# Patient Record
Sex: FEMALE
[De-identification: names, ages, dates, MRNs, and addresses within clinical notes are randomized per-mention and may not be internally consistent; named-entity substitution may affect disease eponyms.]

---

## 2021-07-01 ENCOUNTER — APPOINTMENT (OUTPATIENT)
Dept: OPHTHALMOLOGY | Facility: CLINIC | Age: 81
End: 2021-07-01

## 2021-08-10 ENCOUNTER — APPOINTMENT (OUTPATIENT)
Dept: OPHTHALMOLOGY | Facility: CLINIC | Age: 81
End: 2021-08-10
Payer: MEDICARE

## 2021-08-10 ENCOUNTER — NON-APPOINTMENT (OUTPATIENT)
Age: 81
End: 2021-08-10

## 2021-08-10 PROCEDURE — 92004 COMPRE OPH EXAM NEW PT 1/>: CPT

## 2021-12-27 PROBLEM — Z00.00 ENCOUNTER FOR PREVENTIVE HEALTH EXAMINATION: Status: ACTIVE | Noted: 2021-12-27

## 2022-02-17 ENCOUNTER — APPOINTMENT (OUTPATIENT)
Dept: OPHTHALMOLOGY | Facility: CLINIC | Age: 82
End: 2022-02-17
Payer: MEDICARE

## 2022-02-17 ENCOUNTER — NON-APPOINTMENT (OUTPATIENT)
Age: 82
End: 2022-02-17

## 2022-02-17 PROCEDURE — 76514 ECHO EXAM OF EYE THICKNESS: CPT

## 2022-02-17 PROCEDURE — 92012 INTRM OPH EXAM EST PATIENT: CPT

## 2022-02-17 PROCEDURE — 92133 CPTRZD OPH DX IMG PST SGM ON: CPT

## 2022-08-04 ENCOUNTER — NON-APPOINTMENT (OUTPATIENT)
Age: 82
End: 2022-08-04

## 2022-08-04 ENCOUNTER — APPOINTMENT (OUTPATIENT)
Dept: OPHTHALMOLOGY | Facility: CLINIC | Age: 82
End: 2022-08-04

## 2022-08-04 PROCEDURE — 92014 COMPRE OPH EXAM EST PT 1/>: CPT

## 2022-08-04 PROCEDURE — 92134 CPTRZ OPH DX IMG PST SGM RTA: CPT

## 2023-04-20 ENCOUNTER — APPOINTMENT (OUTPATIENT)
Dept: MAMMOGRAPHY | Facility: CLINIC | Age: 83
End: 2023-04-20

## 2025-03-12 ENCOUNTER — EMERGENCY (EMERGENCY)
Facility: HOSPITAL | Age: 85
LOS: 1 days | Discharge: HOME | End: 2025-03-12
Attending: EMERGENCY MEDICINE
Payer: COMMERCIAL

## 2025-03-12 VITALS
TEMPERATURE: 98 F | HEART RATE: 59 BPM | DIASTOLIC BLOOD PRESSURE: 76 MMHG | HEIGHT: 62 IN | SYSTOLIC BLOOD PRESSURE: 188 MMHG | OXYGEN SATURATION: 96 % | WEIGHT: 139.99 LBS | RESPIRATION RATE: 17 BRPM

## 2025-03-12 LAB
ALBUMIN SERPL ELPH-MCNC: 4 G/DL — SIGNIFICANT CHANGE UP (ref 3.3–5)
ALP SERPL-CCNC: 85 U/L — SIGNIFICANT CHANGE UP (ref 40–120)
ALT FLD-CCNC: 15 U/L — SIGNIFICANT CHANGE UP (ref 10–45)
ANION GAP SERPL CALC-SCNC: 15 MMOL/L — SIGNIFICANT CHANGE UP (ref 5–17)
AST SERPL-CCNC: 18 U/L — SIGNIFICANT CHANGE UP (ref 10–40)
BASOPHILS # BLD AUTO: 0.03 K/UL — SIGNIFICANT CHANGE UP (ref 0–0.2)
BASOPHILS NFR BLD AUTO: 0.4 % — SIGNIFICANT CHANGE UP (ref 0–2)
BILIRUB SERPL-MCNC: 0.6 MG/DL — SIGNIFICANT CHANGE UP (ref 0.2–1.2)
BUN SERPL-MCNC: 24 MG/DL — HIGH (ref 7–23)
CALCIUM SERPL-MCNC: 9.4 MG/DL — SIGNIFICANT CHANGE UP (ref 8.4–10.5)
CHLORIDE SERPL-SCNC: 103 MMOL/L — SIGNIFICANT CHANGE UP (ref 96–108)
CO2 SERPL-SCNC: 21 MMOL/L — LOW (ref 22–31)
CREAT SERPL-MCNC: 0.7 MG/DL — SIGNIFICANT CHANGE UP (ref 0.5–1.3)
EGFR: 85 ML/MIN/1.73M2 — SIGNIFICANT CHANGE UP
EGFR: 85 ML/MIN/1.73M2 — SIGNIFICANT CHANGE UP
EOSINOPHIL # BLD AUTO: 0.03 K/UL — SIGNIFICANT CHANGE UP (ref 0–0.5)
EOSINOPHIL NFR BLD AUTO: 0.4 % — SIGNIFICANT CHANGE UP (ref 0–6)
GLUCOSE SERPL-MCNC: 106 MG/DL — HIGH (ref 70–99)
HCT VFR BLD CALC: 44.1 % — SIGNIFICANT CHANGE UP (ref 34.5–45)
HGB BLD-MCNC: 14.4 G/DL — SIGNIFICANT CHANGE UP (ref 11.5–15.5)
IMM GRANULOCYTES NFR BLD AUTO: 0.4 % — SIGNIFICANT CHANGE UP (ref 0–0.9)
LYMPHOCYTES # BLD AUTO: 1.41 K/UL — SIGNIFICANT CHANGE UP (ref 1–3.3)
LYMPHOCYTES # BLD AUTO: 18.1 % — SIGNIFICANT CHANGE UP (ref 13–44)
MAGNESIUM SERPL-MCNC: 2.2 MG/DL — SIGNIFICANT CHANGE UP (ref 1.6–2.6)
MCHC RBC-ENTMCNC: 30.1 PG — SIGNIFICANT CHANGE UP (ref 27–34)
MCHC RBC-ENTMCNC: 32.7 G/DL — SIGNIFICANT CHANGE UP (ref 32–36)
MCV RBC AUTO: 92.1 FL — SIGNIFICANT CHANGE UP (ref 80–100)
MONOCYTES # BLD AUTO: 0.51 K/UL — SIGNIFICANT CHANGE UP (ref 0–0.9)
MONOCYTES NFR BLD AUTO: 6.6 % — SIGNIFICANT CHANGE UP (ref 2–14)
NEUTROPHILS # BLD AUTO: 5.77 K/UL — SIGNIFICANT CHANGE UP (ref 1.8–7.4)
NEUTROPHILS NFR BLD AUTO: 74.1 % — SIGNIFICANT CHANGE UP (ref 43–77)
NRBC BLD AUTO-RTO: 0 /100 WBCS — SIGNIFICANT CHANGE UP (ref 0–0)
PHOSPHATE SERPL-MCNC: 3.6 MG/DL — SIGNIFICANT CHANGE UP (ref 2.5–4.5)
PLATELET # BLD AUTO: 206 K/UL — SIGNIFICANT CHANGE UP (ref 150–400)
POTASSIUM SERPL-MCNC: 4.6 MMOL/L — SIGNIFICANT CHANGE UP (ref 3.5–5.3)
POTASSIUM SERPL-SCNC: 4.6 MMOL/L — SIGNIFICANT CHANGE UP (ref 3.5–5.3)
PROT SERPL-MCNC: 7.5 G/DL — SIGNIFICANT CHANGE UP (ref 6–8.3)
RBC # BLD: 4.79 M/UL — SIGNIFICANT CHANGE UP (ref 3.8–5.2)
RBC # FLD: 12.8 % — SIGNIFICANT CHANGE UP (ref 10.3–14.5)
SODIUM SERPL-SCNC: 139 MMOL/L — SIGNIFICANT CHANGE UP (ref 135–145)
TROPONIN T, HIGH SENSITIVITY RESULT: 12 NG/L — SIGNIFICANT CHANGE UP (ref 0–51)
WBC # BLD: 7.78 K/UL — SIGNIFICANT CHANGE UP (ref 3.8–10.5)
WBC # FLD AUTO: 7.78 K/UL — SIGNIFICANT CHANGE UP (ref 3.8–10.5)

## 2025-03-12 PROCEDURE — 99223 1ST HOSP IP/OBS HIGH 75: CPT

## 2025-03-12 RX ORDER — DEXTROSE 50 % IN WATER 50 %
15 SYRINGE (ML) INTRAVENOUS ONCE
Refills: 0 | Status: DISCONTINUED | OUTPATIENT
Start: 2025-03-12 | End: 2025-03-16

## 2025-03-12 RX ORDER — SODIUM CHLORIDE 9 G/1000ML
1000 INJECTION, SOLUTION INTRAVENOUS
Refills: 0 | Status: DISCONTINUED | OUTPATIENT
Start: 2025-03-12 | End: 2025-03-16

## 2025-03-12 RX ORDER — DEXTROSE 50 % IN WATER 50 %
12.5 SYRINGE (ML) INTRAVENOUS ONCE
Refills: 0 | Status: DISCONTINUED | OUTPATIENT
Start: 2025-03-12 | End: 2025-03-16

## 2025-03-12 RX ORDER — DEXTROSE 50 % IN WATER 50 %
25 SYRINGE (ML) INTRAVENOUS ONCE
Refills: 0 | Status: DISCONTINUED | OUTPATIENT
Start: 2025-03-12 | End: 2025-03-16

## 2025-03-12 RX ORDER — GLUCAGON 3 MG/1
1 POWDER NASAL ONCE
Refills: 0 | Status: DISCONTINUED | OUTPATIENT
Start: 2025-03-12 | End: 2025-03-16

## 2025-03-12 RX ORDER — INSULIN LISPRO 100 U/ML
INJECTION, SOLUTION INTRAVENOUS; SUBCUTANEOUS AT BEDTIME
Refills: 0 | Status: DISCONTINUED | OUTPATIENT
Start: 2025-03-12 | End: 2025-03-16

## 2025-03-12 RX ORDER — INSULIN LISPRO 100 U/ML
INJECTION, SOLUTION INTRAVENOUS; SUBCUTANEOUS
Refills: 0 | Status: DISCONTINUED | OUTPATIENT
Start: 2025-03-12 | End: 2025-03-16

## 2025-03-12 RX ORDER — ATORVASTATIN CALCIUM 80 MG/1
20 TABLET, FILM COATED ORAL AT BEDTIME
Refills: 0 | Status: DISCONTINUED | OUTPATIENT
Start: 2025-03-12 | End: 2025-03-16

## 2025-03-13 ENCOUNTER — RESULT REVIEW (OUTPATIENT)
Age: 85
End: 2025-03-13

## 2025-03-13 VITALS
HEART RATE: 64 BPM | OXYGEN SATURATION: 96 % | RESPIRATION RATE: 18 BRPM | SYSTOLIC BLOOD PRESSURE: 165 MMHG | TEMPERATURE: 98 F | DIASTOLIC BLOOD PRESSURE: 92 MMHG

## 2025-03-13 LAB
A1C WITH ESTIMATED AVERAGE GLUCOSE RESULT: 6.7 % — HIGH (ref 4–5.6)
CHOLEST SERPL-MCNC: 152 MG/DL — SIGNIFICANT CHANGE UP
ESTIMATED AVERAGE GLUCOSE: 146 MG/DL — HIGH (ref 68–114)
GLUCOSE BLDC GLUCOMTR-MCNC: 119 MG/DL — HIGH (ref 70–99)
GLUCOSE BLDC GLUCOMTR-MCNC: 97 MG/DL — SIGNIFICANT CHANGE UP (ref 70–99)
HDLC SERPL-MCNC: 62 MG/DL — SIGNIFICANT CHANGE UP
LDLC SERPL-MCNC: 74 MG/DL — SIGNIFICANT CHANGE UP
LIPID PNL WITH DIRECT LDL SERPL: 74 MG/DL — SIGNIFICANT CHANGE UP
NONHDLC SERPL-MCNC: 90 MG/DL — SIGNIFICANT CHANGE UP
TRIGL SERPL-MCNC: 86 MG/DL — SIGNIFICANT CHANGE UP
TROPONIN T, HIGH SENSITIVITY RESULT: 14 NG/L — SIGNIFICANT CHANGE UP (ref 0–51)

## 2025-03-13 PROCEDURE — 83036 HEMOGLOBIN GLYCOSYLATED A1C: CPT

## 2025-03-13 PROCEDURE — 80061 LIPID PANEL: CPT

## 2025-03-13 PROCEDURE — 93005 ELECTROCARDIOGRAM TRACING: CPT

## 2025-03-13 PROCEDURE — G0378: CPT

## 2025-03-13 PROCEDURE — 99238 HOSP IP/OBS DSCHRG MGMT 30/<: CPT

## 2025-03-13 PROCEDURE — 80053 COMPREHEN METABOLIC PANEL: CPT

## 2025-03-13 PROCEDURE — 85025 COMPLETE CBC W/AUTO DIFF WBC: CPT

## 2025-03-13 PROCEDURE — 84100 ASSAY OF PHOSPHORUS: CPT

## 2025-03-13 PROCEDURE — 84484 ASSAY OF TROPONIN QUANT: CPT

## 2025-03-13 PROCEDURE — 82962 GLUCOSE BLOOD TEST: CPT

## 2025-03-13 PROCEDURE — 99284 EMERGENCY DEPT VISIT MOD MDM: CPT | Mod: 25

## 2025-03-13 PROCEDURE — 36415 COLL VENOUS BLD VENIPUNCTURE: CPT

## 2025-03-13 PROCEDURE — 93306 TTE W/DOPPLER COMPLETE: CPT | Mod: 26

## 2025-03-13 PROCEDURE — 93306 TTE W/DOPPLER COMPLETE: CPT

## 2025-03-13 PROCEDURE — 83735 ASSAY OF MAGNESIUM: CPT

## 2025-03-13 RX ADMIN — ATORVASTATIN CALCIUM 20 MILLIGRAM(S): 80 TABLET, FILM COATED ORAL at 00:02

## 2025-06-04 ENCOUNTER — INPATIENT (INPATIENT)
Facility: HOSPITAL | Age: 85
LOS: 7 days | Discharge: ANOTHER HOSPITAL | End: 2025-06-12
Attending: STUDENT IN AN ORGANIZED HEALTH CARE EDUCATION/TRAINING PROGRAM | Admitting: STUDENT IN AN ORGANIZED HEALTH CARE EDUCATION/TRAINING PROGRAM
Payer: MEDICARE

## 2025-06-04 VITALS
RESPIRATION RATE: 16 BRPM | HEART RATE: 86 BPM | SYSTOLIC BLOOD PRESSURE: 162 MMHG | OXYGEN SATURATION: 97 % | TEMPERATURE: 98 F | DIASTOLIC BLOOD PRESSURE: 80 MMHG

## 2025-06-04 DIAGNOSIS — G93.9 DISORDER OF BRAIN, UNSPECIFIED: ICD-10-CM

## 2025-06-04 LAB
ALBUMIN SERPL ELPH-MCNC: 3.9 G/DL — SIGNIFICANT CHANGE UP (ref 3.3–5)
ALP SERPL-CCNC: 86 U/L — SIGNIFICANT CHANGE UP (ref 40–120)
ALT FLD-CCNC: 13 U/L — SIGNIFICANT CHANGE UP (ref 4–33)
ANION GAP SERPL CALC-SCNC: 13 MMOL/L — SIGNIFICANT CHANGE UP (ref 7–14)
APTT BLD: 28.5 SEC — SIGNIFICANT CHANGE UP (ref 26.1–36.8)
AST SERPL-CCNC: 13 U/L — SIGNIFICANT CHANGE UP (ref 4–32)
BASOPHILS # BLD AUTO: 0.03 K/UL — SIGNIFICANT CHANGE UP (ref 0–0.2)
BASOPHILS NFR BLD AUTO: 0.3 % — SIGNIFICANT CHANGE UP (ref 0–2)
BILIRUB SERPL-MCNC: 0.4 MG/DL — SIGNIFICANT CHANGE UP (ref 0.2–1.2)
BUN SERPL-MCNC: 31 MG/DL — HIGH (ref 7–23)
CALCIUM SERPL-MCNC: 9.1 MG/DL — SIGNIFICANT CHANGE UP (ref 8.4–10.5)
CHLORIDE SERPL-SCNC: 105 MMOL/L — SIGNIFICANT CHANGE UP (ref 98–107)
CO2 SERPL-SCNC: 21 MMOL/L — LOW (ref 22–31)
CREAT SERPL-MCNC: 0.66 MG/DL — SIGNIFICANT CHANGE UP (ref 0.5–1.3)
EGFR: 86 ML/MIN/1.73M2 — SIGNIFICANT CHANGE UP
EGFR: 86 ML/MIN/1.73M2 — SIGNIFICANT CHANGE UP
EOSINOPHIL # BLD AUTO: 0.02 K/UL — SIGNIFICANT CHANGE UP (ref 0–0.5)
EOSINOPHIL NFR BLD AUTO: 0.2 % — SIGNIFICANT CHANGE UP (ref 0–6)
GLUCOSE SERPL-MCNC: 145 MG/DL — HIGH (ref 70–99)
HCT VFR BLD CALC: 39.1 % — SIGNIFICANT CHANGE UP (ref 34.5–45)
HGB BLD-MCNC: 13.3 G/DL — SIGNIFICANT CHANGE UP (ref 11.5–15.5)
IANC: 7.44 K/UL — HIGH (ref 1.8–7.4)
IMM GRANULOCYTES NFR BLD AUTO: 0.4 % — SIGNIFICANT CHANGE UP (ref 0–0.9)
INR BLD: 0.93 RATIO — SIGNIFICANT CHANGE UP (ref 0.85–1.16)
LYMPHOCYTES # BLD AUTO: 1.3 K/UL — SIGNIFICANT CHANGE UP (ref 1–3.3)
LYMPHOCYTES # BLD AUTO: 13.4 % — SIGNIFICANT CHANGE UP (ref 13–44)
MCHC RBC-ENTMCNC: 30.5 PG — SIGNIFICANT CHANGE UP (ref 27–34)
MCHC RBC-ENTMCNC: 34 G/DL — SIGNIFICANT CHANGE UP (ref 32–36)
MCV RBC AUTO: 89.7 FL — SIGNIFICANT CHANGE UP (ref 80–100)
MONOCYTES # BLD AUTO: 0.84 K/UL — SIGNIFICANT CHANGE UP (ref 0–0.9)
MONOCYTES NFR BLD AUTO: 8.7 % — SIGNIFICANT CHANGE UP (ref 2–14)
NEUTROPHILS # BLD AUTO: 7.44 K/UL — HIGH (ref 1.8–7.4)
NEUTROPHILS NFR BLD AUTO: 77 % — SIGNIFICANT CHANGE UP (ref 43–77)
NRBC # BLD AUTO: 0 K/UL — SIGNIFICANT CHANGE UP (ref 0–0)
NRBC # FLD: 0 K/UL — SIGNIFICANT CHANGE UP (ref 0–0)
NRBC BLD AUTO-RTO: 0 /100 WBCS — SIGNIFICANT CHANGE UP (ref 0–0)
PLATELET # BLD AUTO: 201 K/UL — SIGNIFICANT CHANGE UP (ref 150–400)
POTASSIUM SERPL-MCNC: 4.4 MMOL/L — SIGNIFICANT CHANGE UP (ref 3.5–5.3)
POTASSIUM SERPL-SCNC: 4.4 MMOL/L — SIGNIFICANT CHANGE UP (ref 3.5–5.3)
PROT SERPL-MCNC: 6.8 G/DL — SIGNIFICANT CHANGE UP (ref 6–8.3)
PROTHROM AB SERPL-ACNC: 11.1 SEC — SIGNIFICANT CHANGE UP (ref 9.9–13.4)
RBC # BLD: 4.36 M/UL — SIGNIFICANT CHANGE UP (ref 3.8–5.2)
RBC # FLD: 13.3 % — SIGNIFICANT CHANGE UP (ref 10.3–14.5)
SODIUM SERPL-SCNC: 139 MMOL/L — SIGNIFICANT CHANGE UP (ref 135–145)
TROPONIN T, HIGH SENSITIVITY RESULT: 10 NG/L — SIGNIFICANT CHANGE UP
WBC # BLD: 9.67 K/UL — SIGNIFICANT CHANGE UP (ref 3.8–10.5)
WBC # FLD AUTO: 9.67 K/UL — SIGNIFICANT CHANGE UP (ref 3.8–10.5)

## 2025-06-04 PROCEDURE — 70496 CT ANGIOGRAPHY HEAD: CPT | Mod: 26

## 2025-06-04 PROCEDURE — 70498 CT ANGIOGRAPHY NECK: CPT | Mod: 26

## 2025-06-04 PROCEDURE — 0042T: CPT

## 2025-06-04 PROCEDURE — 70450 CT HEAD/BRAIN W/O DYE: CPT | Mod: 26,XU

## 2025-06-04 PROCEDURE — 99285 EMERGENCY DEPT VISIT HI MDM: CPT | Mod: FS

## 2025-06-04 NOTE — CONSULT NOTE ADULT - SUBJECTIVE AND OBJECTIVE BOX
Neurology - Consult Note    -  Spectra: [phone] ([facility]), [phone] ([facility])  -    HPI: Patient ULYSSES RIBERA is a 84y (1940) with PMH of HTN HLD presenting for acute onset word finding difficulty and speech disturbance. She was in her complete state of health until yesterday when she last had a normal conversation with her daughter at 9:45 pm. She was then noticed to have had difficulty when texting which prompted family to check on her and realized that her speech was disturbed. On evaluation patient aphasic and not in a position to provide accurate history. History was obtained by daughters.      Review of Systems:     NEUROLOGICAL: +As stated in HPI above    All other review of systems is negative unless indicated above.    Allergies:  No Known Allergies      PMHx/PSHx/Family Hx: As above, otherwise see below       Social Hx:      Medications:  MEDICATIONS  (STANDING):    MEDICATIONS  (PRN):      Vitals:  T(C): 36.7 (06-04-25 @ 19:27), Max: 36.7 (06-04-25 @ 19:27)  HR: 86 (06-04-25 @ 19:27) (86 - 86)  BP: 162/80 (06-04-25 @ 19:27) (162/80 - 162/80)  RR: 16 (06-04-25 @ 19:27) (16 - 16)  SpO2: 97% (06-04-25 @ 19:27) (97% - 97%)    Physical Examination:   General - NAD    Neurologic Exam:  Mental status - Awake, Alert, can not provide orientation questions. Speech aphasic, repetition and naming impaired. requires prompting to follow simple commands.    Cranial nerves - VFF, EOMI, face sensation (V1-V3) intact b/l, facial strength intact without asymmetry b/l    Motor - Normal bulk and tone throughout. No drift in bilateral upper extremities but there is drift in RLE.   Strength testing            Deltoid      Biceps      Triceps     Wrist Extension    Wrist Flexion     Interossei         R            5                 5               5                     5                              5                        5                 5  L             5                 5               5                     5                              5                        5                 5              Hip Flexion    Hip Extension    Knee Flexion    Knee Extension    Dorsiflexion    Plantar Flexion  R              5                           5                       5                           5                            5                          5  L              5                           5                        5                           5                            5                          5    Sensation - Light touch intact throughout      Coordination - Finger to Nose intact b/l.     Gait and station - Not performed    Labs:          CAPILLARY BLOOD GLUCOSE      POCT Blood Glucose.: 143 mg/dL (04 Jun 2025 19:33)          CSF:                  Radiology:     Neurology - Consult Note    -  Spectra: [phone] ([facility]), [phone] ([facility])  -    HPI: Patient ULYSSES RIBERA is a 84y (1940) with PMH of HTN HLD presenting for acute onset word finding difficulty and speech disturbance. She was in her complete state of health until yesterday when she last had a normal conversation with her daughter at 9:45 pm. She was then noticed to have had difficulty when texting which prompted family to check on her and realized that her speech was disturbed. On evaluation patient aphasic and not in a position to provide accurate history. History was obtained by daughters.      Review of Systems:     NEUROLOGICAL: +As stated in HPI above    All other review of systems is negative unless indicated above.    Allergies:  No Known Allergies      PMHx/PSHx/Family Hx: As above, otherwise see below       Social Hx:      Medications:  MEDICATIONS  (STANDING):    MEDICATIONS  (PRN):      Vitals:  T(C): 36.7 (06-04-25 @ 19:27), Max: 36.7 (06-04-25 @ 19:27)  HR: 86 (06-04-25 @ 19:27) (86 - 86)  BP: 162/80 (06-04-25 @ 19:27) (162/80 - 162/80)  RR: 16 (06-04-25 @ 19:27) (16 - 16)  SpO2: 97% (06-04-25 @ 19:27) (97% - 97%)    Physical Examination:   General - NAD    Neurologic Exam:  Mental status - Awake, Alert, can not provide orientation questions. Speech aphasic, repetition and naming impaired. requires prompting to follow simple commands.    Cranial nerves - VFF, EOMI, face sensation (V1-V3) intact b/l, facial strength intact without asymmetry b/l    Motor - Normal bulk and tone throughout. No drift in bilateral upper extremities but there is drift in RLE.   Strength testing            Deltoid      Biceps      Triceps            R            5                 5               5                     5                           L             5                 5               5                     5                                         Hip Flexion    Hip Extension    Knee Flexion    Knee Extension    Dorsiflexion    Plantar Flexion  R              4                           5                       5                           5                            5                          5  L              5                           5                        5                           5                            5                          5    Sensation - Light touch intact throughout    Coordination - Finger to Nose intact b/l.     Gait and station - Not performed    Labs:      CAPILLARY BLOOD GLUCOSE      POCT Blood Glucose.: 143 mg/dL (04 Jun 2025 19:33)          CSF:                  Radiology:    < from: CT Brain Stroke Protocol (06.04.25 @ 20:40) >  IMPRESSION:  No acute intracranial hemorrhage, mass effect, or midline shift.    Focal hypodense area along the left parietal lobe which may represent an   acute infarct.      < end of copied text >

## 2025-06-04 NOTE — ED ADULT NURSE REASSESSMENT NOTE - NS ED NURSE REASSESS COMMENT FT1
Pt appears comfortable on stretcher, no signs of distress or discomfort. Resp. equal and unlabored. Safety precautions maintained.
Pt  received from primary RN stable. Respirations even and unlabored. Pt expresses no discomfort at this time. Pt TBA, awaiting orders and bed assignment. Safety precautions in place.

## 2025-06-04 NOTE — CONSULT NOTE ADULT - PROBLEM SELECTOR RECOMMENDATION 9
- MRI brain w/wo   - MRI spine w/wo to r/o drop mets   - CT CAP for malignancy screen  -  Keppra 500mg BID for seizure ppx   - hold steroids for now    Pager [phone]   Case discussed with attending neurosurgeon Dr. Deleon - MRI brain w/wo   - MRI spine w/wo to r/o drop mets   - CT CAP for malignancy screen  -  Keppra 500mg BID for seizure ppx   - hold steroids for now  - admit to medicine for oncology workup    Pager [phone]   Case discussed with attending neurosurgeon Dr. Deleon - MRI stereo brain w/wo  - CT CAP for malignancy screen  -  Keppra 500mg BID for seizure ppx     Pager [phone]   Case discussed with attending neurosurgeon Dr. Deleon - MRI stereo brain w/wo  - CT CAP for malignancy screen  -  Keppra 500mg BID for seizure ppx     Pager [phone]   Case discussed with attending neurosurgeon Dr. Christopher

## 2025-06-04 NOTE — ED PROVIDER NOTE - CLINICAL SUMMARY MEDICAL DECISION MAKING FREE TEXT BOX
83 y/o F with PMH of DM2, HTN, HLD presents brought in by family for concern regarding ams today. Stroke code called. Neurology at bedside. CTs  show "No large vessel occlusion, significant stenosis or vascular abnormality identified. Multiple enhancing lesions in the left cerebral hemisphere in the basal ganglia, corona radiata and centrum semiovale suspicious for enhancing neoplasm. Recommend MRI brain." Neurosurgery consulted. 85 y/o F with PMH of DM2, HTN, HLD presents brought in by family for concern regarding ams today. Stroke code called. Neurology at bedside. CTs  show "No large vessel occlusion, significant stenosis or vascular abnormality identified. Multiple enhancing lesions in the left cerebral hemisphere in the basal ganglia, corona radiata and centrum semiovale suspicious for enhancing neoplasm. Recommend MRI brain." Neurosurgery consulted.  Plan is to admit to hospitalist service for further work up and care.

## 2025-06-04 NOTE — ED ADULT NURSE NOTE - OBJECTIVE STATEMENT
Amanda RN: pt brought to ED by daughter reporting expressive aphasia, confusion and difficulty following commands. hx HTN, HLD. As per daughter, pt is baseline a&ox4, and was baseline at 945pm last evening (Milag[facility] 3) at about 945pm. Pts daughter states when her granddaughter called her this afternoon at 2pm, she reported pt sounded "off and was not making sense." Code stroke activated by MD Small. Pt brought to CT. 20g IV placed to right ac, labs collected and sent. Pending CT results.   pt able to follow commands, expressive aphasia noted. RR even, unlabored. Report given to primary RN Tali,

## 2025-06-04 NOTE — CONSULT NOTE ADULT - NS ATTEND AMEND GEN_ALL_CORE FT
84yF pmhx DM in ER for AMS and word finding difficulty x1 day. Pt states she is having memory difficulty and unsteady gait. No history of previous cancer. CTH showed CTH head showed lesions in LT basal ganglia, corona radiata and centrum semiovale c/f neoplasm. CTA negative for any vascular lesions. Denies headache, dizziness, nausea, vomiting. Does not take blood thinners. PLT, coags, WNL.    - MRI stereo brain w/wo  - CT CAP for malignancy screen  -  Keppra 500mg BID for seizure ppx

## 2025-06-04 NOTE — CONSULT NOTE ADULT - ASSESSMENT
84y (1940) with PMH of HTN HLD presenting for acute onset word finding difficulty and speech disturbance. She was in her complete state of health until yesterday when she last had a normal conversation with her daughter at 9:45 pm. She was then noticed to have had difficulty when texting which prompted family to check on her and realized that her speech was disturbed. On evaluation patient aphasic and not in a position to provide accurate history. History was obtained by daughters. Exam notable for expressive aphasia and LLE drift.    impression: aphasia and possible right lower limb drift likely localizing to the left brain       84y (1940) with PMH of HTN HLD presenting for acute onset word finding difficulty and speech disturbance. She was in her complete state of health until yesterday when she last had a normal conversation with her daughter at 9:45 pm. She was then noticed to have had difficulty when texting which prompted family to check on her and realized that her speech was disturbed. On evaluation patient aphasic and not in a position to provide accurate history. History was obtained by daughters. Exam notable for expressive aphasia and LLE drift.    impression: aphasia and possible right lower limb drift likely localizing to the left brain less likely stroke given vascular mass on imaging     plan:  [] consult neurosurgery  [] dexamethasone 10 mg stat then 4 mg q 6 hours but defer to neurosurgery  [] no AC AP needed at this point given not stroke  [] MRI brain with and without contrast and additional sequences per neurosurgery recommendations          84y (1940) with PMH of HTN HLD presenting for acute onset word finding difficulty and speech disturbance. She was in her complete state of health until yesterday when she last had a normal conversation with her daughter at 9:45 pm. She was then noticed to have had difficulty when texting which prompted family to check on her and realized that her speech was disturbed. On evaluation patient aphasic and not in a position to provide accurate history. History was obtained by daughters. Exam notable for expressive aphasia and RLE drift.    impression: aphasia and possible right lower limb drift likely localizing to the left brain less likely stroke given vascular mass on imaging     plan:  [] consult neurosurgery  [] dexamethasone 10 mg stat then 4 mg q 6 hours but defer to neurosurgery  [] no AC AP needed at this point given not stroke  [] MRI brain with and without contrast and additional sequences per neurosurgery recommendations

## 2025-06-04 NOTE — ED PROVIDER NOTE - PHYSICAL EXAMINATION
CONSTITUTIONAL: Comfortable; in no acute distress. Non-toxic appearing.   NEURO: Awake, Alert, can not provide orientation questions. Speech aphasic, repetition and naming impaired. requires prompting to follow simple commands. Strength and sensation intact. No facial droop or slurred speech.  PSYCH: Mood appropriate.  HEAD: NCAT  MUSCULOSKELETAL/EXTREMITIES: FROM in all four extremities; no extremity edema.  SKIN: Warm; dry; no apparent lesions or exudate

## 2025-06-04 NOTE — ED PROVIDER NOTE - PROGRESS NOTE DETAILS
CORY:  Patient placed in room 20.  During initial interview, it was reported that patient's last known "normal" time was 9:45 PM one night ago.  She is demonstrating expressive aphasia on my exam.  Given she is within stroke code window, stroke code called.  Patient taken to CT, neuro at bedside.  Will follow labs, ct, neuro recs.  Dispo pending.

## 2025-06-04 NOTE — CONSULT NOTE ADULT - SUBJECTIVE AND OBJECTIVE BOX
NEUROSURGERY CONSULT    HPI: 84yF pmhx DM, HTN, HLD, bib daughter for AMS and word finding difficulty x1 day. Pt states she is having memory difficulty. Also reports multiple falls within the past month, likely 2/2 unsteady gait. Of note, pt has no known cancer hx. CTA head showed lesions in LT basal ganglia, corona radiata and centrum semiovale c/f neoplasm. Denies headache, dizziness, nausea, vomiting. Does not take blood thinners. PLT, coags, Na, K all wnl.     RADIOLOGY:   < from: CT Angio Neck Stroke Protocol w/ IV Cont (06.04.25 @ 20:57) >  IMPRESSION:    CT PERFUSION:  Technical limitations: None.    Core infarction: 0 ml  Penumbra / tissue at risk for active ischemia: 0 ml    CTA NECK:  No evidence of significant stenosis or occlusion.    CTA HEAD:  No large vessel occlusion, significant stenosis or vascular abnormality   identified.    Multiple enhancing lesions in the left cerebral hemisphere in the basal   ganglia, corona radiata and centrum semiovale suspicious for enhancing   neoplasm. Recommend MRI brain.    Findings discussed with Dr. Harrell by Dr. Nash on 6/4/2025 9:09 PM with   readback confirmation.        --- End of Report ---    < end of copied text >      MEDS:      Vital Signs Last 24 Hrs  T(C): 36.7 (04 Jun 2025 21:11), Max: 36.7 (04 Jun 2025 19:27)  T(F): 98 (04 Jun 2025 21:11), Max: 98.1 (04 Jun 2025 19:27)  HR: 71 (04 Jun 2025 21:11) (71 - 86)  BP: 182/88 (04 Jun 2025 21:11) (162/80 - 182/88)  BP(mean): --  RR: 16 (04 Jun 2025 21:11) (16 - 16)  SpO2: 98% (04 Jun 2025 21:11) (97% - 98%)    Parameters below as of 04 Jun 2025 21:11  Patient On (Oxygen Delivery Method): room air        LABS:                        13.3   9.67  )-----------( 201      ( 04 Jun 2025 20:47 )             39.1     06-04    139  |  105  |  31[H]  ----------------------------<  145[H]  4.4   |  21[L]  |  0.66    Ca    9.1      04 Jun 2025 20:47    TPro  6.8  /  Alb  3.9  /  TBili  0.4  /  DBili  x   /  AST  13  /  ALT  13  /  AlkPhos  86  06-04    PT/INR - ( 04 Jun 2025 20:47 )   PT: 11.1 sec;   INR: 0.93 ratio         PTT - ( 04 Jun 2025 20:47 )  PTT:28.5 sec      PHYSICAL EXAM:  Aox2 to name, place only  EOS, EOMI, PERRL  Follows commands but has some confusion when trying to recall names/places/times/events.   Face sym, tongue midline  MAEx4 with good strength   SILT   No PD

## 2025-06-04 NOTE — ED ADULT TRIAGE NOTE - CHIEF COMPLAINT QUOTE
pt coming from home, LKW 1400 yesterday when daughter saw her. today daughter called pt at 6pm and noted pt was confused. pt presents with difficulty following simple commands. expressive aphasia, oriented to self. per daughter pt baseline a&ox4 completely independent.

## 2025-06-04 NOTE — CONSULT NOTE ADULT - ASSESSMENT
84yF pmhx DM, HTN, HLD, bib daughter for AMS and word finding difficulty x1 day. Pt states she is having memory difficulty. Also reports multiple falls within the past month, likely 2/2 unsteady gait. Of note, pt has no known cancer hx. CTA head showed lesions in LT basal ganglia, corona radiata and centrum semiovale c/f neoplasm. Denies headache, dizziness, nausea, vomiting. Does not take blood thinners. PLT, coags, Na, K all wnl.

## 2025-06-04 NOTE — ED PROVIDER NOTE - OBJECTIVE STATEMENT
83 y/o F with PMH of DM2, HTN, HLD presents brought in by family for concern regarding ams today. Patient at baseline is aox4, lives independently, and can take care of all her adls. Daughter spoke to her last night at 9:45pm and she was talking at her normal mental baseline at that time. This afternoon the patient called her granddaughter who noticed that she was confused and having difficulty expressing herself properly which is atypical for her prompting her current visit. Never had anything like this before. Denies any other complaints or concerns. Denies chest pain, SOB, cough, fevers, chills, abdominal pain, N/V/D/C, urinary complaints, HA, dizziness, numbness, tingling, weakness, trauma, injuries, falls, sick contacts, recent travel.

## 2025-06-04 NOTE — ED PROVIDER NOTE - ATTENDING APP SHARED VISIT CONTRIBUTION OF CARE
Brief HPI:  84-year-old female past medical history of hypertension, hyperlipidemia, diabetes brought in by family with concern for altered mental status today.  Last known "normal" time was 9:45 PM last night when talking on phone with daughter who is at bedside.  Today patient was found to be confused and have word finding difficulties.  No reported fever, chills, vomiting, trauma.      Vitals:   Reviewed    Exam:    GEN:  Non-toxic appearing, non-distressed, speaking full sentences, non-diaphoretic, AAOx3  HEENT:  NCAT, neck supple, EOMI, PERRLA, sclera anicteric, no conjunctival pallor or injection, no stridor, normal voice, no tonsillar exudate, uvula midline  CV:  regular rhythm and rate, s1/s2 audible, no murmurs, rubs or gallops, peripheral pulses 2+ and symmetric  PULM:  non-labored respirations, lungs clear to auscultation bilaterally, no wheezes, crackles or rales  ABD:  non distended, non-tender, no rebound, no guarding, negative Hewitt's sign, bowel sounds normal, no cvat  MSK:  no gross deformity, non-tender extremities and joints, range of motion grossly normal appearing, no extremity edema, extremities warm and well perfused   NEURO:  AAOx3, expressive aphasia, CN II-XII intact, motor 5/5 in upper and lower extremities bilaterally, sensation grossly intact in extremities and trunk, finger to nose testing wnl, no nystagmus, negative Romberg, no pronator drift  SKIN:  warm, dry, no rash or vesicles     A/P:  84-year-old female past medical history of hypertension, hyperlipidemia, diabetes brought in by family with concern for altered mental status today.  VSS.  With expressive aphasia.  Stroke code called given patient in window.  Plan to follow labs, ct, neuro recs.  Disposition pending.

## 2025-06-05 DIAGNOSIS — R63.8 OTHER SYMPTOMS AND SIGNS CONCERNING FOOD AND FLUID INTAKE: ICD-10-CM

## 2025-06-05 DIAGNOSIS — E78.5 HYPERLIPIDEMIA, UNSPECIFIED: ICD-10-CM

## 2025-06-05 DIAGNOSIS — E11.9 TYPE 2 DIABETES MELLITUS WITHOUT COMPLICATIONS: ICD-10-CM

## 2025-06-05 DIAGNOSIS — G93.9 DISORDER OF BRAIN, UNSPECIFIED: ICD-10-CM

## 2025-06-05 DIAGNOSIS — R00.1 BRADYCARDIA, UNSPECIFIED: ICD-10-CM

## 2025-06-05 LAB
A1C WITH ESTIMATED AVERAGE GLUCOSE RESULT: 6.5 % — HIGH (ref 4–5.6)
ADD ON TEST-SPECIMEN IN LAB: SIGNIFICANT CHANGE UP
ALBUMIN SERPL ELPH-MCNC: 3.7 G/DL — SIGNIFICANT CHANGE UP (ref 3.3–5)
ALP SERPL-CCNC: 85 U/L — SIGNIFICANT CHANGE UP (ref 40–120)
ALT FLD-CCNC: 14 U/L — SIGNIFICANT CHANGE UP (ref 4–33)
ANION GAP SERPL CALC-SCNC: 11 MMOL/L — SIGNIFICANT CHANGE UP (ref 7–14)
APTT BLD: 29.2 SEC — SIGNIFICANT CHANGE UP (ref 26.1–36.8)
AST SERPL-CCNC: 14 U/L — SIGNIFICANT CHANGE UP (ref 4–32)
BASOPHILS # BLD AUTO: 0.03 K/UL — SIGNIFICANT CHANGE UP (ref 0–0.2)
BASOPHILS NFR BLD AUTO: 0.4 % — SIGNIFICANT CHANGE UP (ref 0–2)
BCA 255 TISS QL IMSTN: 9.5 U/ML — SIGNIFICANT CHANGE UP
BILIRUB SERPL-MCNC: 0.8 MG/DL — SIGNIFICANT CHANGE UP (ref 0.2–1.2)
BLD GP AB SCN SERPL QL: NEGATIVE — SIGNIFICANT CHANGE UP
BUN SERPL-MCNC: 22 MG/DL — SIGNIFICANT CHANGE UP (ref 7–23)
CALCIUM SERPL-MCNC: 8.9 MG/DL — SIGNIFICANT CHANGE UP (ref 8.4–10.5)
CANCER AG125 SERPL-ACNC: 7 U/ML — SIGNIFICANT CHANGE UP
CANCER AG19-9 SERPL-ACNC: <2 U/ML — SIGNIFICANT CHANGE UP
CEA SERPL-MCNC: 1.6 NG/ML — SIGNIFICANT CHANGE UP (ref 0–3.8)
CHLORIDE SERPL-SCNC: 106 MMOL/L — SIGNIFICANT CHANGE UP (ref 98–107)
CO2 SERPL-SCNC: 22 MMOL/L — SIGNIFICANT CHANGE UP (ref 22–31)
CREAT SERPL-MCNC: 0.64 MG/DL — SIGNIFICANT CHANGE UP (ref 0.5–1.3)
EGFR: 87 ML/MIN/1.73M2 — SIGNIFICANT CHANGE UP
EGFR: 87 ML/MIN/1.73M2 — SIGNIFICANT CHANGE UP
EOSINOPHIL # BLD AUTO: 0.06 K/UL — SIGNIFICANT CHANGE UP (ref 0–0.5)
EOSINOPHIL NFR BLD AUTO: 0.8 % — SIGNIFICANT CHANGE UP (ref 0–6)
ESTIMATED AVERAGE GLUCOSE: 140 — SIGNIFICANT CHANGE UP
GLUCOSE BLDC GLUCOMTR-MCNC: 117 MG/DL — HIGH (ref 70–99)
GLUCOSE BLDC GLUCOMTR-MCNC: 126 MG/DL — HIGH (ref 70–99)
GLUCOSE BLDC GLUCOMTR-MCNC: 132 MG/DL — HIGH (ref 70–99)
GLUCOSE BLDC GLUCOMTR-MCNC: 148 MG/DL — HIGH (ref 70–99)
GLUCOSE BLDC GLUCOMTR-MCNC: 94 MG/DL — SIGNIFICANT CHANGE UP (ref 70–99)
GLUCOSE SERPL-MCNC: 101 MG/DL — HIGH (ref 70–99)
HCT VFR BLD CALC: 39.8 % — SIGNIFICANT CHANGE UP (ref 34.5–45)
HGB BLD-MCNC: 13.2 G/DL — SIGNIFICANT CHANGE UP (ref 11.5–15.5)
IANC: 4.71 K/UL — SIGNIFICANT CHANGE UP (ref 1.8–7.4)
IMM GRANULOCYTES NFR BLD AUTO: 0.4 % — SIGNIFICANT CHANGE UP (ref 0–0.9)
INR BLD: 1.01 RATIO — SIGNIFICANT CHANGE UP (ref 0.85–1.16)
LYMPHOCYTES # BLD AUTO: 1.7 K/UL — SIGNIFICANT CHANGE UP (ref 1–3.3)
LYMPHOCYTES # BLD AUTO: 23.5 % — SIGNIFICANT CHANGE UP (ref 13–44)
MAGNESIUM SERPL-MCNC: 2.1 MG/DL — SIGNIFICANT CHANGE UP (ref 1.6–2.6)
MCHC RBC-ENTMCNC: 30.1 PG — SIGNIFICANT CHANGE UP (ref 27–34)
MCHC RBC-ENTMCNC: 33.2 G/DL — SIGNIFICANT CHANGE UP (ref 32–36)
MCV RBC AUTO: 90.9 FL — SIGNIFICANT CHANGE UP (ref 80–100)
MONOCYTES # BLD AUTO: 0.71 K/UL — SIGNIFICANT CHANGE UP (ref 0–0.9)
MONOCYTES NFR BLD AUTO: 9.8 % — SIGNIFICANT CHANGE UP (ref 2–14)
NEUTROPHILS # BLD AUTO: 4.71 K/UL — SIGNIFICANT CHANGE UP (ref 1.8–7.4)
NEUTROPHILS NFR BLD AUTO: 65.1 % — SIGNIFICANT CHANGE UP (ref 43–77)
NRBC # BLD AUTO: 0 K/UL — SIGNIFICANT CHANGE UP (ref 0–0)
NRBC # FLD: 0 K/UL — SIGNIFICANT CHANGE UP (ref 0–0)
NRBC BLD AUTO-RTO: 0 /100 WBCS — SIGNIFICANT CHANGE UP (ref 0–0)
PHOSPHATE SERPL-MCNC: 3.7 MG/DL — SIGNIFICANT CHANGE UP (ref 2.5–4.5)
PLATELET # BLD AUTO: 195 K/UL — SIGNIFICANT CHANGE UP (ref 150–400)
POTASSIUM SERPL-MCNC: 4 MMOL/L — SIGNIFICANT CHANGE UP (ref 3.5–5.3)
POTASSIUM SERPL-SCNC: 4 MMOL/L — SIGNIFICANT CHANGE UP (ref 3.5–5.3)
PROT SERPL-MCNC: 6.3 G/DL — SIGNIFICANT CHANGE UP (ref 6–8.3)
PROTHROM AB SERPL-ACNC: 11.7 SEC — SIGNIFICANT CHANGE UP (ref 9.9–13.4)
RBC # BLD: 4.38 M/UL — SIGNIFICANT CHANGE UP (ref 3.8–5.2)
RBC # FLD: 13.3 % — SIGNIFICANT CHANGE UP (ref 10.3–14.5)
RH IG SCN BLD-IMP: POSITIVE — SIGNIFICANT CHANGE UP
SODIUM SERPL-SCNC: 139 MMOL/L — SIGNIFICANT CHANGE UP (ref 135–145)
TSH SERPL-MCNC: 1.98 UIU/ML — SIGNIFICANT CHANGE UP (ref 0.27–4.2)
WBC # BLD: 7.24 K/UL — SIGNIFICANT CHANGE UP (ref 3.8–10.5)
WBC # FLD AUTO: 7.24 K/UL — SIGNIFICANT CHANGE UP (ref 3.8–10.5)

## 2025-06-05 PROCEDURE — 71260 CT THORAX DX C+: CPT | Mod: 26

## 2025-06-05 PROCEDURE — 99223 1ST HOSP IP/OBS HIGH 75: CPT

## 2025-06-05 PROCEDURE — 74177 CT ABD & PELVIS W/CONTRAST: CPT | Mod: 26

## 2025-06-05 RX ORDER — DEXTROSE 50 % IN WATER 50 %
12.5 SYRINGE (ML) INTRAVENOUS ONCE
Refills: 0 | Status: DISCONTINUED | OUTPATIENT
Start: 2025-06-05 | End: 2025-06-12

## 2025-06-05 RX ORDER — LEVETIRACETAM 10 MG/ML
500 INJECTION, SOLUTION INTRAVENOUS EVERY 12 HOURS
Refills: 0 | Status: DISCONTINUED | OUTPATIENT
Start: 2025-06-05 | End: 2025-06-12

## 2025-06-05 RX ORDER — SODIUM CHLORIDE 9 G/1000ML
1000 INJECTION, SOLUTION INTRAVENOUS
Refills: 0 | Status: DISCONTINUED | OUTPATIENT
Start: 2025-06-05 | End: 2025-06-12

## 2025-06-05 RX ORDER — INSULIN LISPRO 100 U/ML
INJECTION, SOLUTION INTRAVENOUS; SUBCUTANEOUS
Refills: 0 | Status: DISCONTINUED | OUTPATIENT
Start: 2025-06-05 | End: 2025-06-12

## 2025-06-05 RX ORDER — ATORVASTATIN CALCIUM 80 MG/1
20 TABLET, FILM COATED ORAL AT BEDTIME
Refills: 0 | Status: DISCONTINUED | OUTPATIENT
Start: 2025-06-05 | End: 2025-06-12

## 2025-06-05 RX ORDER — GLUCAGON 3 MG/1
1 POWDER NASAL ONCE
Refills: 0 | Status: DISCONTINUED | OUTPATIENT
Start: 2025-06-05 | End: 2025-06-12

## 2025-06-05 RX ORDER — ACETAMINOPHEN 500 MG/5ML
650 LIQUID (ML) ORAL EVERY 6 HOURS
Refills: 0 | Status: DISCONTINUED | OUTPATIENT
Start: 2025-06-05 | End: 2025-06-12

## 2025-06-05 RX ORDER — INSULIN LISPRO 100 U/ML
INJECTION, SOLUTION INTRAVENOUS; SUBCUTANEOUS AT BEDTIME
Refills: 0 | Status: DISCONTINUED | OUTPATIENT
Start: 2025-06-05 | End: 2025-06-12

## 2025-06-05 RX ORDER — DEXTROSE 50 % IN WATER 50 %
15 SYRINGE (ML) INTRAVENOUS ONCE
Refills: 0 | Status: DISCONTINUED | OUTPATIENT
Start: 2025-06-05 | End: 2025-06-12

## 2025-06-05 RX ORDER — DEXTROSE 50 % IN WATER 50 %
25 SYRINGE (ML) INTRAVENOUS ONCE
Refills: 0 | Status: DISCONTINUED | OUTPATIENT
Start: 2025-06-05 | End: 2025-06-12

## 2025-06-05 RX ADMIN — LEVETIRACETAM 500 MILLIGRAM(S): 10 INJECTION, SOLUTION INTRAVENOUS at 01:06

## 2025-06-05 RX ADMIN — LEVETIRACETAM 500 MILLIGRAM(S): 10 INJECTION, SOLUTION INTRAVENOUS at 18:17

## 2025-06-05 RX ADMIN — ATORVASTATIN CALCIUM 20 MILLIGRAM(S): 80 TABLET, FILM COATED ORAL at 21:04

## 2025-06-05 RX ADMIN — Medication 1 APPLICATION(S): at 17:58

## 2025-06-05 NOTE — OCCUPATIONAL THERAPY INITIAL EVALUATION ADULT - PERTINENT HX OF CURRENT PROBLEM, REHAB EVAL
84 year old female with history of DM2, HTN, HLD presents brought in by family for concern regarding AMS. CT brain found to have brain metastases with multiple enhancing lesions in the left cerebral hemisphere in the basal ganglia, corona radiata and centrum semiovale.

## 2025-06-05 NOTE — PHYSICAL THERAPY INITIAL EVALUATION ADULT - ADDITIONAL COMMENTS
Pt is a questionable historian, history taken from care coordinator note in chart, patient lives alone, in an apartment with an elevator; patient was independent in ambulation and performing ADLs.

## 2025-06-05 NOTE — H&P ADULT - NSHPLABSRESULTS_GEN_ALL_CORE
.  LABS:                         13.3   9.67  )-----------( 201      ( 04 Jun 2025 20:47 )             39.1     06-04    139  |  105  |  31[H]  ----------------------------<  145[H]  4.4   |  21[L]  |  0.66    Ca    9.1      04 Jun 2025 20:47    TPro  6.8  /  Alb  3.9  /  TBili  0.4  /  DBili  x   /  AST  13  /  ALT  13  /  AlkPhos  86  06-04    PT/INR - ( 04 Jun 2025 20:47 )   PT: 11.1 sec;   INR: 0.93 ratio         PTT - ( 04 Jun 2025 20:47 )  PTT:28.5 sec  Urinalysis Basic - ( 04 Jun 2025 20:47 )    Color: x / Appearance: x / SG: x / pH: x  Gluc: 145 mg/dL / Ketone: x  / Bili: x / Urobili: x   Blood: x / Protein: x / Nitrite: x   Leuk Esterase: x / RBC: x / WBC x   Sq Epi: x / Non Sq Epi: x / Bacteria: x      < from: CT Brain Perfusion Maps Stroke (06.04.25 @ 20:58) >    CTA NECK:  No evidence of significant stenosis or occlusion.    CTA HEAD:  No large vessel occlusion, significant stenosis or vascular abnormality   identified.    Multiple enhancing lesions in the left cerebral hemisphere in the basal   ganglia, corona radiata and centrum semiovale suspicious for enhancing   neoplasm. Recommend MRI brain.    Findings discussed with Dr. Harrell by Dr. Nash on 6/4/2025 9:09 PM with   readback confirmation.    < end of copied text >

## 2025-06-05 NOTE — H&P ADULT - NSHPPHYSICALEXAM_GEN_ALL_CORE
PHYSICAL EXAM:  GENERAL: NAD, well-developed  HEAD:  Atraumatic, Normocephalic  EYES: EOMI, PERRLA, conjunctiva and sclera clear  NECK: Supple, No JVD  CHEST/LUNG: Clear to auscultation bilaterally; No wheeze  HEART: Regular rate and rhythm; No murmurs, rubs, or gallops  ABDOMEN: Soft, Nontender, Nondistended; Bowel sounds present  EXTREMITIES:  2+ Peripheral Pulses, No clubbing, cyanosis, or edema  PSYCH: AAOx1-2 to self and location  NEUROLOGY: delayed speech, inability to articulate words  SKIN: No rashes or lesions

## 2025-06-05 NOTE — PATIENT PROFILE ADULT - FALL HARM RISK - ATTEMPT OOB
Update History & Physical    The patient's History and Physical of April 17, 2024 was reviewed with the patient and I examined the patient. There was no change. The surgical site was confirmed by the patient and me.     Plan: The risks, benefits, expected outcome, and alternative to the recommended procedure have been discussed with the patient. Patient understands and wants to proceed with the procedure.     Electronically signed by Cassandra Ga MD on 4/30/2024 at 9:59 AM       No

## 2025-06-05 NOTE — OCCUPATIONAL THERAPY INITIAL EVALUATION ADULT - DIAGNOSIS, OT EVAL
s/p AMS, s/p left hemisphere brain lesions; decreased functional mobility, decreased ADL performance, right sided weakness, difficulty word finding

## 2025-06-05 NOTE — H&P ADULT - ASSESSMENT
85 y/o F with PMH of DM2, HTN, HLD presents brought in by family for concern regarding ams today. Patient at baseline is aox4, lives independently, and can take care of all her adls found to have brain metastases w/ Multiple enhancing lesions in the left cerebral hemisphere in the basal ganglia, corona radiata and centrum semiovale suspicious for enhancing neoplasm dominant lesion left corona radiata measuring 2.0 cm in AP diameter by 2.4 cm transversely by 2.3 cm in craniocaudal diameter.

## 2025-06-05 NOTE — PHYSICAL THERAPY INITIAL EVALUATION ADULT - ACTIVE RANGE OF MOTION EXAMINATION, REHAB EVAL
except right Upper Extremity shoulder flexion 0-100 degrees; right hip flexion 0-95 degrees; right knee extension -10 degrees from neutral/bilateral upper extremity Active ROM was WFL (within functional limits)/bilateral  lower extremity Active ROM was WFL (within functional limits)

## 2025-06-05 NOTE — PATIENT PROFILE ADULT - FALL HARM RISK - HARM RISK INTERVENTIONS
Assistance with ambulation/Assistance OOB with selected safe patient handling equipment/Communicate Risk of Fall with Harm to all staff/Discuss with provider need for PT consult/Monitor gait and stability/Reinforce activity limits and safety measures with patient and family/Tailored Fall Risk Interventions/Use of alarms - bed, chair and/or voice tab/Visual Cue: Yellow wristband and red socks/Bed in lowest position, wheels locked, appropriate side rails in place/Call bell, personal items and telephone in reach/Instruct patient to call for assistance before getting out of bed or chair/Non-slip footwear when patient is out of bed/[place] to call system/Physically safe environment - no spills, clutter or unnecessary equipment/Purposeful Proactive Rounding/Room/bathroom lighting operational, light cord in reach

## 2025-06-05 NOTE — PHYSICAL THERAPY INITIAL EVALUATION ADULT - PERTINENT HX OF CURRENT PROBLEM, REHAB EVAL
As per chart, patient is an 84 year old female, with PMH of DM2, HTN, HLD presents brought in by family for concern regarding ams today. Patient at baseline is aox4, lives independently, and can take care of all her adls. Daughter spoke to her last night at 9:45pm and she was talking at her normal mental baseline at that time. Patient is a/o times 1-2 currently. CT scan found w/ multiple brain lesions.

## 2025-06-05 NOTE — H&P ADULT - HISTORY OF PRESENT ILLNESS
83 y/o F with PMH of DM2, HTN, HLD presents brought in by family for concern regarding ams today. Patient at baseline is aox4, lives independently, and can take care of all her adls. Daughter spoke to her last night at 9:45pm and she was talking at her normal mental baseline at that time. Patient is a/o times 1-2 currently. CT scan found w/ multiple brain lesions. Per daughter over the phone, patient has had routine mammogram/pap smear/ and colonoscopies without major findings.    Vital Signs Last 24 Hrs  T(C): 36.7 (05 Jun 2025 00:55), Max: 36.7 (04 Jun 2025 19:27)  T(F): 98.1 (05 Jun 2025 00:55), Max: 98.1 (04 Jun 2025 19:27)  HR: 63 (05 Jun 2025 00:55) (63 - 86)  BP: 155/77 (05 Jun 2025 00:55) (155/77 - 182/88)  BP(mean): 99 (05 Jun 2025 00:55) (99 - 99)  RR: 16 (05 Jun 2025 00:55) (16 - 16)  SpO2: 96% (05 Jun 2025 00:55) (96% - 98%)    Parameters below as of 05 Jun 2025 00:55  Patient On (Oxygen Delivery Method): room air

## 2025-06-05 NOTE — H&P ADULT - PROBLEM SELECTOR PLAN 1
-CT head w/ lesion left corona radiata measuring 2.0 cm in AP diameter by 2.4 cm transversely by 2.3 cm in craniocaudal diameter.  -obtain MRI head spine iv contrast and chest/a/p iv contrast and tumor markers for tumor staging  -appreciate neurosx input, keppra 500 mg  oral BID  -fall precautions ambulate w/ assistance  -PT eval

## 2025-06-05 NOTE — PHYSICAL THERAPY INITIAL EVALUATION ADULT - GENERAL OBSERVATIONS, REHAB EVAL
Pt found semi reclined in bed; +telemetry monitor; +prima fit; HR 50 bpm; spoke with CASSIA Sepulveda, who advised patient may participate. Pt found semi reclined in bed; +telemetry monitor; +prima fit; HR 50 bpm; spoke with CASSIA Sepulveda, who advised patient may participate. Pt demonstrated decreased coordination right Upper Extremity with difficulty placing objects on her tray table.

## 2025-06-05 NOTE — PROGRESS NOTE ADULT - SUBJECTIVE AND OBJECTIVE BOX
Patient is a 84y old  Female who presents with a chief complaint of brain lesions (05 Jun 2025 01:28)      SUBJECTIVE / OVERNIGHT EVENTS: Pt seen and examined at 11:20am, no overnight events, pt denies any complaints, per nsg no new issues reported.     MEDICATIONS  (STANDING):  atorvastatin 20 milliGRAM(s) Oral at bedtime  chlorhexidine 2% Cloths 1 Application(s) Topical daily  dextrose 5%. 1000 milliLiter(s) (100 mL/Hr) IV Continuous <Continuous>  dextrose 5%. 1000 milliLiter(s) (50 mL/Hr) IV Continuous <Continuous>  dextrose 50% Injectable 25 Gram(s) IV Push once  dextrose 50% Injectable 12.5 Gram(s) IV Push once  dextrose 50% Injectable 25 Gram(s) IV Push once  glucagon  Injectable 1 milliGRAM(s) IntraMuscular once  insulin lispro (ADMELOG) corrective regimen sliding scale   SubCutaneous three times a day before meals  insulin lispro (ADMELOG) corrective regimen sliding scale   SubCutaneous at bedtime  levETIRAcetam   Injectable 500 milliGRAM(s) IV Push every 12 hours    MEDICATIONS  (PRN):  acetaminophen     Tablet .. 650 milliGRAM(s) Oral every 6 hours PRN Temp greater or equal to 38C (100.4F), Mild Pain (1 - 3)  dextrose Oral Gel 15 Gram(s) Oral once PRN Blood Glucose LESS THAN 70 milliGRAM(s)/deciliter      Vital Signs Last 24 Hrs  T(C): 36.6 (05 Jun 2025 12:00), Max: 36.7 (04 Jun 2025 19:27)  T(F): 97.8 (05 Jun 2025 12:00), Max: 98.1 (04 Jun 2025 19:27)  HR: 75 (05 Jun 2025 12:00) (43 - 86)  BP: 131/51 (05 Jun 2025 12:00) (131/51 - 182/88)  BP(mean): 99 (05 Jun 2025 00:55) (99 - 99)  RR: 18 (05 Jun 2025 12:00) (16 - 18)  SpO2: 96% (05 Jun 2025 12:00) (96% - 100%)    Parameters below as of 05 Jun 2025 12:00  Patient On (Oxygen Delivery Method): room air      CAPILLARY BLOOD GLUCOSE  143 (04 Jun 2025 21:19)      POCT Blood Glucose.: 132 mg/dL (05 Jun 2025 11:11)  POCT Blood Glucose.: 94 mg/dL (05 Jun 2025 08:03)  POCT Blood Glucose.: 117 mg/dL (05 Jun 2025 02:16)  POCT Blood Glucose.: 143 mg/dL (04 Jun 2025 19:33)    I&O's Summary      PHYSICAL EXAM:  GENERAL: NAD  CHEST/LUNG: Clear to auscultation bilaterally; No wheeze  HEART: Regular rate and rhythm  ABDOMEN: Soft, Nontender, Nondistended  EXTREMITIES: no LE edema, RLE old echymosis+  PSYCH: Calm  NEUROLOGY: AA, oriented to self, know that she is in the hospital, not to time, slow speech  SKIN: dry and warm    LABS:                        13.2   7.24  )-----------( 195      ( 05 Jun 2025 06:15 )             39.8     06-05    139  |  106  |  22  ----------------------------<  101[H]  4.0   |  22  |  0.64    Ca    8.9      05 Jun 2025 06:15  Phos  3.7     06-05  Mg     2.10     06-05    TPro  6.3  /  Alb  3.7  /  TBili  0.8  /  DBili  x   /  AST  14  /  ALT  14  /  AlkPhos  85  06-05    PT/INR - ( 05 Jun 2025 06:15 )   PT: 11.7 sec;   INR: 1.01 ratio         PTT - ( 05 Jun 2025 06:15 )  PTT:29.2 sec      Urinalysis Basic - ( 05 Jun 2025 06:15 )    Color: x / Appearance: x / SG: x / pH: x  Gluc: 101 mg/dL / Ketone: x  / Bili: x / Urobili: x   Blood: x / Protein: x / Nitrite: x   Leuk Esterase: x / RBC: x / WBC x   Sq Epi: x / Non Sq Epi: x / Bacteria: x        RADIOLOGY & ADDITIONAL TESTS:    Imaging Personally Reviewed:    Consultant(s) Notes Reviewed:      Care Discussed with Consultants/Other Providers:

## 2025-06-05 NOTE — OCCUPATIONAL THERAPY INITIAL EVALUATION ADULT - GENERAL OBSERVATIONS, REHAB EVAL
Patient received semisupine in bed in NAD; agreeable to participate in OT evaluation. +telemetry monitor. /51 mmHg. +telemetry monitor. A&Ox3 with difficulty word finding.

## 2025-06-06 LAB
ANION GAP SERPL CALC-SCNC: 12 MMOL/L — SIGNIFICANT CHANGE UP (ref 7–14)
BUN SERPL-MCNC: 22 MG/DL — SIGNIFICANT CHANGE UP (ref 7–23)
CALCIUM SERPL-MCNC: 9 MG/DL — SIGNIFICANT CHANGE UP (ref 8.4–10.5)
CHLORIDE SERPL-SCNC: 105 MMOL/L — SIGNIFICANT CHANGE UP (ref 98–107)
CO2 SERPL-SCNC: 22 MMOL/L — SIGNIFICANT CHANGE UP (ref 22–31)
CREAT SERPL-MCNC: 0.7 MG/DL — SIGNIFICANT CHANGE UP (ref 0.5–1.3)
EGFR: 85 ML/MIN/1.73M2 — SIGNIFICANT CHANGE UP
EGFR: 85 ML/MIN/1.73M2 — SIGNIFICANT CHANGE UP
GLUCOSE BLDC GLUCOMTR-MCNC: 102 MG/DL — HIGH (ref 70–99)
GLUCOSE BLDC GLUCOMTR-MCNC: 138 MG/DL — HIGH (ref 70–99)
GLUCOSE BLDC GLUCOMTR-MCNC: 138 MG/DL — HIGH (ref 70–99)
GLUCOSE BLDC GLUCOMTR-MCNC: 171 MG/DL — HIGH (ref 70–99)
GLUCOSE SERPL-MCNC: 110 MG/DL — HIGH (ref 70–99)
HCT VFR BLD CALC: 41.8 % — SIGNIFICANT CHANGE UP (ref 34.5–45)
HGB BLD-MCNC: 13.7 G/DL — SIGNIFICANT CHANGE UP (ref 11.5–15.5)
MAGNESIUM SERPL-MCNC: 2.1 MG/DL — SIGNIFICANT CHANGE UP (ref 1.6–2.6)
MCHC RBC-ENTMCNC: 30 PG — SIGNIFICANT CHANGE UP (ref 27–34)
MCHC RBC-ENTMCNC: 32.8 G/DL — SIGNIFICANT CHANGE UP (ref 32–36)
MCV RBC AUTO: 91.7 FL — SIGNIFICANT CHANGE UP (ref 80–100)
MRSA PCR RESULT.: SIGNIFICANT CHANGE UP
NRBC # BLD AUTO: 0 K/UL — SIGNIFICANT CHANGE UP (ref 0–0)
NRBC # FLD: 0 K/UL — SIGNIFICANT CHANGE UP (ref 0–0)
NRBC BLD AUTO-RTO: 0 /100 WBCS — SIGNIFICANT CHANGE UP (ref 0–0)
PHOSPHATE SERPL-MCNC: 3.9 MG/DL — SIGNIFICANT CHANGE UP (ref 2.5–4.5)
PLATELET # BLD AUTO: 201 K/UL — SIGNIFICANT CHANGE UP (ref 150–400)
POTASSIUM SERPL-MCNC: 4.2 MMOL/L — SIGNIFICANT CHANGE UP (ref 3.5–5.3)
POTASSIUM SERPL-SCNC: 4.2 MMOL/L — SIGNIFICANT CHANGE UP (ref 3.5–5.3)
RBC # BLD: 4.56 M/UL — SIGNIFICANT CHANGE UP (ref 3.8–5.2)
RBC # FLD: 13.4 % — SIGNIFICANT CHANGE UP (ref 10.3–14.5)
S AUREUS DNA NOSE QL NAA+PROBE: SIGNIFICANT CHANGE UP
SODIUM SERPL-SCNC: 139 MMOL/L — SIGNIFICANT CHANGE UP (ref 135–145)
WBC # BLD: 6.92 K/UL — SIGNIFICANT CHANGE UP (ref 3.8–10.5)
WBC # FLD AUTO: 6.92 K/UL — SIGNIFICANT CHANGE UP (ref 3.8–10.5)

## 2025-06-06 PROCEDURE — 99233 SBSQ HOSP IP/OBS HIGH 50: CPT

## 2025-06-06 PROCEDURE — 99223 1ST HOSP IP/OBS HIGH 75: CPT | Mod: GC

## 2025-06-06 RX ADMIN — LEVETIRACETAM 500 MILLIGRAM(S): 10 INJECTION, SOLUTION INTRAVENOUS at 05:01

## 2025-06-06 RX ADMIN — Medication 1 APPLICATION(S): at 11:41

## 2025-06-06 RX ADMIN — INSULIN LISPRO 1: 100 INJECTION, SOLUTION INTRAVENOUS; SUBCUTANEOUS at 17:21

## 2025-06-06 RX ADMIN — LEVETIRACETAM 500 MILLIGRAM(S): 10 INJECTION, SOLUTION INTRAVENOUS at 17:29

## 2025-06-06 RX ADMIN — ATORVASTATIN CALCIUM 20 MILLIGRAM(S): 80 TABLET, FILM COATED ORAL at 21:44

## 2025-06-06 NOTE — SWALLOW BEDSIDE ASSESSMENT ADULT - COMMENTS
Hospitalist 6/6: "83 y/o F with PMH of DM2, HTN, HLD presents brought in by family for concern regarding ams today. Patient at baseline is aox4, lives independently, and can take care of all her adls found to have brain metastases w/ Multiple enhancing lesions in the left cerebral hemisphere in the basal ganglia, corona radiata and centrum semiovale suspicious for enhancing neoplasm dominant lesion left corona radiata measuring 2.0 cm in AP diameter by 2.4 cm transversely by 2.3 cm in craniocaudal diameter."    CT Chest 6/5: IMPRESSION: Innumerable sub-6 mm bilateral upper lung predominant groundglass nodules. Although indeterminate, most likely differential is inflammation or atypical infection. Dilated main pancreatic duct of the pancreatic body and tail with abrupt cut off at the proximal body. Recommend further evaluation with MR abdomen.

## 2025-06-06 NOTE — DIETITIAN INITIAL EVALUATION ADULT - OTHER INFO
Per chart, "85 y/o F with PMH of DM2, HTN, HLD presents brought in by family for concern regarding ams today. Patient at baseline is aox4, lives independently, and can take care of all her adls found to have brain metastases w/ Multiple enhancing lesions in the left cerebral hemisphere in the basal ganglia, corona radiata and centrum semiovale suspicious for enhancing neoplasm dominant lesion left corona radiata measuring 2.0 cm in AP diameter by 2.4 cm transversely by 2.3 cm in craniocaudal diameter."    Nutrition interview: No reports of any recent episodes of nausea, vomiting, diarrhea or constipation, Last BM noted last nighty per Pt. Denies any chewing/swallowing difficulties. No food allergies. Pt is unsure of UBW. Per HIE Pt's last weight March: 140#. Current wt in house (6/5): 155.1#. Food preferences explored and noted. Intake is good-excellent (%) per RN flowsheets and per pt. Pt observed with 50% intake at breakfast today. Feeding skills: set up help required.

## 2025-06-06 NOTE — DIETITIAN INITIAL EVALUATION ADULT - ORAL INTAKE PTA/DIET HISTORY
Pt lives at home with alone. Pt states she is able to cook for herself at home and able to obtain groceries without difficulty. No specific diet followed PTA. No supplements/Vitamins taken PTA.

## 2025-06-06 NOTE — DIETITIAN INITIAL EVALUATION ADULT - PERTINENT MEDS FT
MEDICATIONS  (STANDING):  atorvastatin 20 milliGRAM(s) Oral at bedtime  chlorhexidine 2% Cloths 1 Application(s) Topical daily  dextrose 5%. 1000 milliLiter(s) (100 mL/Hr) IV Continuous <Continuous>  dextrose 5%. 1000 milliLiter(s) (50 mL/Hr) IV Continuous <Continuous>  dextrose 50% Injectable 25 Gram(s) IV Push once  dextrose 50% Injectable 12.5 Gram(s) IV Push once  dextrose 50% Injectable 25 Gram(s) IV Push once  glucagon  Injectable 1 milliGRAM(s) IntraMuscular once  insulin lispro (ADMELOG) corrective regimen sliding scale   SubCutaneous three times a day before meals  insulin lispro (ADMELOG) corrective regimen sliding scale   SubCutaneous at bedtime  levETIRAcetam   Injectable 500 milliGRAM(s) IV Push every 12 hours    MEDICATIONS  (PRN):  acetaminophen     Tablet .. 650 milliGRAM(s) Oral every 6 hours PRN Temp greater or equal to 38C (100.4F), Mild Pain (1 - 3)  dextrose Oral Gel 15 Gram(s) Oral once PRN Blood Glucose LESS THAN 70 milliGRAM(s)/deciliter

## 2025-06-06 NOTE — DIETITIAN INITIAL EVALUATION ADULT - PERTINENT LABORATORY DATA
06-06    139  |  105  |  22  ----------------------------<  110[H]  4.2   |  22  |  0.70    Ca    9.0      06 Jun 2025 05:14  Phos  3.9     06-06  Mg     2.10     06-06    TPro  6.3  /  Alb  3.7  /  TBili  0.8  /  DBili  x   /  AST  14  /  ALT  14  /  AlkPhos  85  06-05  POCT Blood Glucose.: 138 mg/dL (06-06-25 @ 11:43)  A1C with Estimated Average Glucose Result: 6.5 % (06-05-25 @ 06:15)

## 2025-06-06 NOTE — SWALLOW BEDSIDE ASSESSMENT ADULT - ADDITIONAL RECOMMENDATIONS
Team advised to reconsult this service if patient exhibits change in medical condition which may impact oral diet tolerance. This service to follow up as schedule permits.

## 2025-06-06 NOTE — DIETITIAN INITIAL EVALUATION ADULT - NS FNS DIET ORDER
Diet, Soft and Bite Sized:   Consistent Carbohydrate {Evening Snack} (CSTCHOSN) (06-06-25 @ 10:06)

## 2025-06-06 NOTE — CONSULT NOTE ADULT - ATTENDING COMMENTS
83 yo F with PMH DM2, HTN, and HLD who presented on 6/4/25 with AMS and was found to have multiple brain lesions concerning for malignancy. CT chest obtained for malignancy work up showing bilateral ground glass opacities (6mm) of unclear etiology. Too small for biopsy, will need CT followup; infectious workup is reasonable although pt has no respiratory symptoms. Await MRCP for abdominal pathology workup.

## 2025-06-06 NOTE — SWALLOW BEDSIDE ASSESSMENT ADULT - SWALLOW EVAL: DIAGNOSIS
Functional oral phase for regular solids and thin liquids characterized by adequate retrieval, mastication, anterior posterior transfer, and oral clearance. Functional pharyngeal phase for above noted consistencies characterized by swallow initiation and hyolaryngeal excursion upon palpation with no overt clinical s/s impaired airway protection.

## 2025-06-06 NOTE — PROGRESS NOTE ADULT - SUBJECTIVE AND OBJECTIVE BOX
Patient is a 84y old  Female who presents with a chief complaint of brain lesions (06 Jun 2025 13:28)      SUBJECTIVE / OVERNIGHT EVENTS: Pt seen and examined at 11:05am, no overnight events, pt denies any abdominal pain, sob, chest pain or any other complaints, per nsg no new issues reported.         MEDICATIONS  (STANDING):  atorvastatin 20 milliGRAM(s) Oral at bedtime  chlorhexidine 2% Cloths 1 Application(s) Topical daily  dextrose 5%. 1000 milliLiter(s) (100 mL/Hr) IV Continuous <Continuous>  dextrose 5%. 1000 milliLiter(s) (50 mL/Hr) IV Continuous <Continuous>  dextrose 50% Injectable 25 Gram(s) IV Push once  dextrose 50% Injectable 12.5 Gram(s) IV Push once  dextrose 50% Injectable 25 Gram(s) IV Push once  glucagon  Injectable 1 milliGRAM(s) IntraMuscular once  insulin lispro (ADMELOG) corrective regimen sliding scale   SubCutaneous three times a day before meals  insulin lispro (ADMELOG) corrective regimen sliding scale   SubCutaneous at bedtime  levETIRAcetam   Injectable 500 milliGRAM(s) IV Push every 12 hours    MEDICATIONS  (PRN):  acetaminophen     Tablet .. 650 milliGRAM(s) Oral every 6 hours PRN Temp greater or equal to 38C (100.4F), Mild Pain (1 - 3)  dextrose Oral Gel 15 Gram(s) Oral once PRN Blood Glucose LESS THAN 70 milliGRAM(s)/deciliter      Vital Signs Last 24 Hrs  T(C): 36.6 (06 Jun 2025 12:00), Max: 36.8 (06 Jun 2025 00:00)  T(F): 97.9 (06 Jun 2025 12:00), Max: 98.2 (06 Jun 2025 00:00)  HR: 59 (06 Jun 2025 12:00) (54 - 66)  BP: 132/59 (06 Jun 2025 12:00) (130/69 - 150/60)  BP(mean): --  RR: 17 (06 Jun 2025 12:00) (17 - 18)  SpO2: 92% (06 Jun 2025 12:00) (92% - 100%)    Parameters below as of 06 Jun 2025 12:00  Patient On (Oxygen Delivery Method): room air      CAPILLARY BLOOD GLUCOSE      POCT Blood Glucose.: 138 mg/dL (06 Jun 2025 11:43)  POCT Blood Glucose.: 102 mg/dL (06 Jun 2025 08:11)  POCT Blood Glucose.: 148 mg/dL (05 Jun 2025 21:01)  POCT Blood Glucose.: 126 mg/dL (05 Jun 2025 18:00)    I&O's Summary      PHYSICAL EXAM:  GENERAL: NAD  CHEST/LUNG: Clear to auscultation bilaterally; No wheeze  HEART: Regular rate and rhythm  ABDOMEN: Soft, Nontender, Nondistended  EXTREMITIES: no LE edema, RLE old echymosis+  PSYCH: Calm  NEUROLOGY: AA, oriented to self, know that she is in the hospital, knows the month (June) but not year, slow speech  SKIN: dry and warm  LABS:                        13.7   6.92  )-----------( 201      ( 06 Jun 2025 04:53 )             41.8     06-06    139  |  105  |  22  ----------------------------<  110[H]  4.2   |  22  |  0.70    Ca    9.0      06 Jun 2025 05:14  Phos  3.9     06-06  Mg     2.10     06-06    TPro  6.3  /  Alb  3.7  /  TBili  0.8  /  DBili  x   /  AST  14  /  ALT  14  /  AlkPhos  85  06-05    PT/INR - ( 05 Jun 2025 06:15 )   PT: 11.7 sec;   INR: 1.01 ratio         PTT - ( 05 Jun 2025 06:15 )  PTT:29.2 sec      Urinalysis Basic - ( 06 Jun 2025 05:14 )    Color: x / Appearance: x / SG: x / pH: x  Gluc: 110 mg/dL / Ketone: x  / Bili: x / Urobili: x   Blood: x / Protein: x / Nitrite: x   Leuk Esterase: x / RBC: x / WBC x   Sq Epi: x / Non Sq Epi: x / Bacteria: x        RADIOLOGY & ADDITIONAL TESTS:  < from: CT Abdomen and Pelvis w/ IV Cont (06.05.25 @ 17:11) >  CT CHEST IC    < end of copied text >  < from: CT Abdomen and Pelvis w/ IV Cont (06.05.25 @ 17:11) >  CT ABDOMEN AND PELVIS     < end of copied text >  < from: CT Abdomen and Pelvis w/ IV Cont (06.05.25 @ 17:11) >  Innumerable sub-6 mm bilateral upper lung predominant groundglass   nodules. Although indeterminate, most likely differential is inflammation   or atypical infection.  Dilated main pancreatic duct of the pancreatic body and tail with abrupt   cut off at the proximal body. Recommend further evaluation with MR   abdomen.        < end of copied text >    Imaging Personally Reviewed:    Consultant(s) Notes Reviewed:      Care Discussed with Consultants/Other Providers:

## 2025-06-06 NOTE — CONSULT NOTE ADULT - ASSESSMENT
83 yo F with PMH DM2, HTN, and HLD who presented on 6/4/25 with AMS and was found to have multiple brain lesions concerning for malignancy. CT chest obtained for malignancy work up showing bilateral ground glass opacities (6mm) for which pulmonary is consulted.     # bilateral ggos  # brain lesion  At baseline, she is independent. No pulmonary issues, not on inhalers. Never smoker, no occupational or environmental exposures. No pets, daughter has a dog and spends time at daughter's home. Endorses recent travel to [place] ([place]) but no recent illnesses or sick contacts. No cough, sputum production, hemoptysis, fever, chills, night sweats, weight loss. No prior CT chest for comparison.   - 6 mm ggos are too small for biopsy   - low suspicion for infectious etiology without infectious symptoms, but can obtain full RVP, blood cultures, sputum culture (if produces sputum)  - dysphagia screen performed  - incentive spirometry, oob to chair  - will need repeat CT chest in 6-8 weeks to monitor ground glass opacities      Prior to discharge:  Please use the HOME pulm discharge token. The patient will be given a telehealth appointment in 1-2 days following discharge. Please let the patient know the appointment will be on telehealth.     [facility]  [address] [place] [address]  [phone]

## 2025-06-06 NOTE — CONSULT NOTE ADULT - SUBJECTIVE AND OBJECTIVE BOX
HPI:  83 yo F with PMH DM2, HTN, and HLD who presented on 6/4/25 with AMS and was found to have multiple brain lesions concerning for malignancy. CT chest obtained for malignancy work up showing bilateral ground glass opacities (6mm) for which pulmonary is consulted.   At baseline, she is independent. No pulmonary issues, not on inhalers. Never smoker, no occupational or environmental exposures. No pets, daughter has a dog and spends time at daughter's home. Endorses recent travel to [place] ([facility]) but no recent illnesses or sick contacts. No cough, sputum production, hemoptysis, fever, chills, night sweats, weight loss. No prior CT chest for comparison.     PAST MEDICAL & SURGICAL HISTORY:  HLD (hyperlipidemia)      DM2 (diabetes mellitus, type 2)      S/P cholecystectomy          FAMILY HISTORY:  No pertinent family history in first degree relatives        SOCIAL HISTORY:  Smoking: [x ] Never Smoked [ ] Former Smoker (__ packs x ___ years) [ ] Current Smoker  (__ packs x ___ years)  Substance Use: [ ] Never Used [ ] Used ____  EtOH Use:  Marital Status: [ ] Single [ ]  [ ]  [ ]   Sexual History:   Occupation:  Recent Travel: [place]  Country of Birth:  Advance Directives:    Allergies    No Known Allergies    Intolerances        HOME MEDICATIONS:  Home Medications:  Lipitor 20 mg oral tablet: 1 tab(s) orally once a day (at bedtime) (05 Jun 2025 01:19)  MetFORMIN (Eqv-Glucophage XR) 500 mg oral tablet, extended release: 1 tab(s) orally once a day (05 Jun 2025 01:19)      REVIEW OF SYSTEMS:  All systems negative except as documented above.    OBJECTIVE:  ICU Vital Signs Last 24 Hrs  T(C): 36.6 (06 Jun 2025 12:00), Max: 36.8 (06 Jun 2025 00:00)  T(F): 97.9 (06 Jun 2025 12:00), Max: 98.2 (06 Jun 2025 00:00)  HR: 59 (06 Jun 2025 12:00) (54 - 66)  BP: 132/59 (06 Jun 2025 12:00) (130/69 - 150/60)  BP(mean): --  ABP: --  ABP(mean): --  RR: 17 (06 Jun 2025 12:00) (17 - 18)  SpO2: 92% (06 Jun 2025 12:00) (92% - 99%)    O2 Parameters below as of 06 Jun 2025 12:00  Patient On (Oxygen Delivery Method): room air              CAPILLARY BLOOD GLUCOSE  143 (04 Jun 2025 21:19)      POCT Blood Glucose.: 171 mg/dL (06 Jun 2025 17:04)      PHYSICAL EXAM:  General: NAD  HEENT: EOMI, sclera anicteric  Neck: supple  Cardiovascular: RR  Respiratory: CTAB, no wheezes, crackles, or rhonci  Abdomen: soft  Extremities: warm and well perfused, no edema, no clubbing  Skin: no rashes  Neurological: awake and alert, difficulty speaking    HOSPITAL MEDICATIONS:  Standing Meds:  atorvastatin 20 milliGRAM(s) Oral at bedtime  chlorhexidine 2% Cloths 1 Application(s) Topical daily  dextrose 5%. 1000 milliLiter(s) IV Continuous <Continuous>  dextrose 5%. 1000 milliLiter(s) IV Continuous <Continuous>  dextrose 50% Injectable 25 Gram(s) IV Push once  dextrose 50% Injectable 12.5 Gram(s) IV Push once  dextrose 50% Injectable 25 Gram(s) IV Push once  glucagon  Injectable 1 milliGRAM(s) IntraMuscular once  insulin lispro (ADMELOG) corrective regimen sliding scale   SubCutaneous three times a day before meals  insulin lispro (ADMELOG) corrective regimen sliding scale   SubCutaneous at bedtime  levETIRAcetam   Injectable 500 milliGRAM(s) IV Push every 12 hours      PRN Meds:  acetaminophen     Tablet .. 650 milliGRAM(s) Oral every 6 hours PRN  dextrose Oral Gel 15 Gram(s) Oral once PRN      LABS:                        13.7   6.92  )-----------( 201      ( 06 Jun 2025 04:53 )             41.8     Hgb Trend: 13.7<--, 13.2<--, 13.3<--  06-06    139  |  105  |  22  ----------------------------<  110[H]  4.2   |  22  |  0.70    Ca    9.0      06 Jun 2025 05:14  Phos  3.9     06-06  Mg     2.10     06-06    TPro  6.3  /  Alb  3.7  /  TBili  0.8  /  DBili  x   /  AST  14  /  ALT  14  /  AlkPhos  85  06-05    Creatinine Trend: 0.70<--, 0.64<--, 0.66<--  PT/INR - ( 05 Jun 2025 06:15 )   PT: 11.7 sec;   INR: 1.01 ratio         PTT - ( 05 Jun 2025 06:15 )  PTT:29.2 sec  Urinalysis Basic - ( 06 Jun 2025 05:14 )    Color: x / Appearance: x / SG: x / pH: x  Gluc: 110 mg/dL / Ketone: x  / Bili: x / Urobili: x   Blood: x / Protein: x / Nitrite: x   Leuk Esterase: x / RBC: x / WBC x   Sq Epi: x / Non Sq Epi: x / Bacteria: x            MICROBIOLOGY:       RADIOLOGY:  [x] Reviewed and interpreted by me

## 2025-06-07 ENCOUNTER — TRANSCRIPTION ENCOUNTER (OUTPATIENT)
Age: 85
End: 2025-06-07

## 2025-06-07 LAB
ADD ON TEST-SPECIMEN IN LAB: SIGNIFICANT CHANGE UP
ANION GAP SERPL CALC-SCNC: 14 MMOL/L — SIGNIFICANT CHANGE UP (ref 7–14)
B PERT DNA SPEC QL NAA+PROBE: SIGNIFICANT CHANGE UP
B PERT+PARAPERT DNA PNL SPEC NAA+PROBE: SIGNIFICANT CHANGE UP
BUN SERPL-MCNC: 28 MG/DL — HIGH (ref 7–23)
C PNEUM DNA SPEC QL NAA+PROBE: SIGNIFICANT CHANGE UP
CALCIUM SERPL-MCNC: 8.8 MG/DL — SIGNIFICANT CHANGE UP (ref 8.4–10.5)
CHLORIDE SERPL-SCNC: 106 MMOL/L — SIGNIFICANT CHANGE UP (ref 98–107)
CO2 SERPL-SCNC: 20 MMOL/L — LOW (ref 22–31)
CREAT SERPL-MCNC: 0.65 MG/DL — SIGNIFICANT CHANGE UP (ref 0.5–1.3)
EGFR: 87 ML/MIN/1.73M2 — SIGNIFICANT CHANGE UP
EGFR: 87 ML/MIN/1.73M2 — SIGNIFICANT CHANGE UP
FLUAV AG NPH QL: SIGNIFICANT CHANGE UP
FLUAV SUBTYP SPEC NAA+PROBE: SIGNIFICANT CHANGE UP
FLUBV AG NPH QL: SIGNIFICANT CHANGE UP
FLUBV RNA SPEC QL NAA+PROBE: SIGNIFICANT CHANGE UP
GLUCOSE BLDC GLUCOMTR-MCNC: 104 MG/DL — HIGH (ref 70–99)
GLUCOSE BLDC GLUCOMTR-MCNC: 107 MG/DL — HIGH (ref 70–99)
GLUCOSE BLDC GLUCOMTR-MCNC: 142 MG/DL — HIGH (ref 70–99)
GLUCOSE BLDC GLUCOMTR-MCNC: 151 MG/DL — HIGH (ref 70–99)
GLUCOSE SERPL-MCNC: 106 MG/DL — HIGH (ref 70–99)
HADV DNA SPEC QL NAA+PROBE: SIGNIFICANT CHANGE UP
HCOV 229E RNA SPEC QL NAA+PROBE: SIGNIFICANT CHANGE UP
HCOV HKU1 RNA SPEC QL NAA+PROBE: SIGNIFICANT CHANGE UP
HCOV NL63 RNA SPEC QL NAA+PROBE: SIGNIFICANT CHANGE UP
HCOV OC43 RNA SPEC QL NAA+PROBE: SIGNIFICANT CHANGE UP
HCT VFR BLD CALC: 42.3 % — SIGNIFICANT CHANGE UP (ref 34.5–45)
HGB BLD-MCNC: 14.2 G/DL — SIGNIFICANT CHANGE UP (ref 11.5–15.5)
HMPV RNA SPEC QL NAA+PROBE: SIGNIFICANT CHANGE UP
HPIV1 RNA SPEC QL NAA+PROBE: SIGNIFICANT CHANGE UP
HPIV2 RNA SPEC QL NAA+PROBE: SIGNIFICANT CHANGE UP
HPIV3 RNA SPEC QL NAA+PROBE: SIGNIFICANT CHANGE UP
HPIV4 RNA SPEC QL NAA+PROBE: SIGNIFICANT CHANGE UP
M PNEUMO DNA SPEC QL NAA+PROBE: SIGNIFICANT CHANGE UP
MAGNESIUM SERPL-MCNC: 2.2 MG/DL — SIGNIFICANT CHANGE UP (ref 1.6–2.6)
MCHC RBC-ENTMCNC: 30.1 PG — SIGNIFICANT CHANGE UP (ref 27–34)
MCHC RBC-ENTMCNC: 33.6 G/DL — SIGNIFICANT CHANGE UP (ref 32–36)
MCV RBC AUTO: 89.6 FL — SIGNIFICANT CHANGE UP (ref 80–100)
NRBC # BLD AUTO: 0 K/UL — SIGNIFICANT CHANGE UP (ref 0–0)
NRBC # FLD: 0 K/UL — SIGNIFICANT CHANGE UP (ref 0–0)
NRBC BLD AUTO-RTO: 0 /100 WBCS — SIGNIFICANT CHANGE UP (ref 0–0)
PHOSPHATE SERPL-MCNC: 3.7 MG/DL — SIGNIFICANT CHANGE UP (ref 2.5–4.5)
PLATELET # BLD AUTO: 199 K/UL — SIGNIFICANT CHANGE UP (ref 150–400)
POTASSIUM SERPL-MCNC: 4.2 MMOL/L — SIGNIFICANT CHANGE UP (ref 3.5–5.3)
POTASSIUM SERPL-SCNC: 4.2 MMOL/L — SIGNIFICANT CHANGE UP (ref 3.5–5.3)
RAPID RVP RESULT: SIGNIFICANT CHANGE UP
RBC # BLD: 4.72 M/UL — SIGNIFICANT CHANGE UP (ref 3.8–5.2)
RBC # FLD: 13.3 % — SIGNIFICANT CHANGE UP (ref 10.3–14.5)
RSV RNA NPH QL NAA+NON-PROBE: SIGNIFICANT CHANGE UP
RSV RNA SPEC QL NAA+PROBE: SIGNIFICANT CHANGE UP
RV+EV RNA SPEC QL NAA+PROBE: SIGNIFICANT CHANGE UP
SARS-COV-2 RNA SPEC QL NAA+PROBE: SIGNIFICANT CHANGE UP
SARS-COV-2 RNA SPEC QL NAA+PROBE: SIGNIFICANT CHANGE UP
SODIUM SERPL-SCNC: 140 MMOL/L — SIGNIFICANT CHANGE UP (ref 135–145)
SOURCE RESPIRATORY: SIGNIFICANT CHANGE UP
WBC # BLD: 7.1 K/UL — SIGNIFICANT CHANGE UP (ref 3.8–10.5)
WBC # FLD AUTO: 7.1 K/UL — SIGNIFICANT CHANGE UP (ref 3.8–10.5)

## 2025-06-07 PROCEDURE — 99232 SBSQ HOSP IP/OBS MODERATE 35: CPT

## 2025-06-07 PROCEDURE — 70553 MRI BRAIN STEM W/O & W/DYE: CPT | Mod: 26

## 2025-06-07 RX ADMIN — LEVETIRACETAM 500 MILLIGRAM(S): 10 INJECTION, SOLUTION INTRAVENOUS at 05:43

## 2025-06-07 RX ADMIN — ATORVASTATIN CALCIUM 20 MILLIGRAM(S): 80 TABLET, FILM COATED ORAL at 21:15

## 2025-06-07 RX ADMIN — Medication 1 APPLICATION(S): at 17:47

## 2025-06-07 RX ADMIN — LEVETIRACETAM 500 MILLIGRAM(S): 10 INJECTION, SOLUTION INTRAVENOUS at 17:48

## 2025-06-07 NOTE — DISCHARGE NOTE PROVIDER - NSDCCPTREATMENT_GEN_ALL_CORE_FT
PRINCIPAL PROCEDURE  Procedure: MR MRCP  Findings and Treatment: IMPRESSION:  Post gadolinium and MRCP images are significantly degraded from motion   artifacts.  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  Likely another small sidebranch IPMN in the uncinate process where   pancreatic duct is normal.  Evaluation by Gastroenterology is advised.     PRINCIPAL PROCEDURE  Procedure: MRI brain  Findings and Treatment: Multifocal left hemispheric enhancing lesions.  2.  Small right temporal meningioma.

## 2025-06-07 NOTE — DISCHARGE NOTE PROVIDER - NSDCCPCAREPLAN_GEN_ALL_CORE_FT
PRINCIPAL DISCHARGE DIAGNOSIS  Diagnosis: Brain lesion  Assessment and Plan of Treatment: you had CT head showed  w/ lesion left corona radiata measuring 2.0 cm in AP diameter by 2.4 cm transversely by 2.3 cm in craniocaudal diameter.  - chest/a/p showed Innumerable sub-6 mm bilateral upper lung predominant groundglass nodules. Although indeterminate, most likely differential is inflammation or atypical infection. Dilated main pancreatic duct of the pancreatic body and tail with abrupt cut off at the proximal body. Recommend further evaluation with MR   abdomen.MRI which showed -------------------  MRCP done -------------------------     PRINCIPAL DISCHARGE DIAGNOSIS  Diagnosis: Brain lesion  Assessment and Plan of Treatment: you had CT head showed  w/ lesion left corona radiata measuring 2.0 cm in AP diameter by 2.4 cm transversely by 2.3 cm in craniocaudal diameter.  MRI showed 1.  Multifocal left hemispheric enhancing lesions.  2.  Small right temporal meningioma.  - Chest/a/p showed Innumerable sub-6 mm bilateral upper lung predominant groundglass nodules. Although indeterminate, most likely differential is inflammation or atypical infection. Dilated main pancreatic duct of the pancreatic body and tail with abrupt cut off at the proximal body. Recommend further evaluation with MR   abdomen.  MRCP -p     PRINCIPAL DISCHARGE DIAGNOSIS  Diagnosis: Brain lesion  Assessment and Plan of Treatment: You were found to have lesions in your brain that are concerning for cancer. You wished to be transferred to [facility] [place] [facility] for further workup.     PRINCIPAL DISCHARGE DIAGNOSIS  Diagnosis: Brain lesion  Assessment and Plan of Treatment: You were found to have lesions in your brain that are concerning for cancer. Your MRCP was concerning for pancreatic cancer but would require biopsy to confirm the diagnosis. You wished to be transferred to [facility] [place]up.

## 2025-06-07 NOTE — DISCHARGE NOTE PROVIDER - ATTENDING DISCHARGE PHYSICAL EXAMINATION:
VITAL SIGNS:  Vital Signs Last 24 Hrs  T(C): 36.6 (12 Jun 2025 08:15), Max: 36.8 (11 Jun 2025 16:00)  T(F): 97.9 (12 Jun 2025 08:15), Max: 98.2 (11 Jun 2025 16:00)  HR: 63 (12 Jun 2025 08:15) (59 - 65)  BP: 154/61 (12 Jun 2025 08:15) (130/61 - 154/61)  BP(mean): --  RR: 17 (12 Jun 2025 08:15) (16 - 17)  SpO2: 99% (12 Jun 2025 08:15) (97% - 99%)    Parameters below as of 12 Jun 2025 08:15  Patient On (Oxygen Delivery Method): room air        PHYSICAL EXAM:    General: NAD  HEENT: MMM  Neck: supple  Cardiovascular: +S1/S2; RRR  Respiratory: CTA B/L; no W/R/R  Gastrointestinal: soft, NT/ND  Extremities: WWP; no edema, clubbing or cyanosis  Neurological: awake and alert; word findiing difficulty but otherwise communicating and able to follow commands

## 2025-06-07 NOTE — PROVIDER CONTACT NOTE (OTHER) - ASSESSMENT
A&Ox4. Asymptomatic. Sleeping in bed. No distress noted.
A&Ox4. Asymptomatic. Sleeping in bed. No distress noted. Pt has a history of going danna while sleeping
A&Ox4. Asymptomatic. Sleeping in bed. No distress noted.

## 2025-06-07 NOTE — PROGRESS NOTE ADULT - SUBJECTIVE AND OBJECTIVE BOX
Patient is a 84y old  Female who presents with a chief complaint of brain lesions (06 Jun 2025 17:10)      SUBJECTIVE / OVERNIGHT EVENTS: Pt seen and examined at 11:42am, no overnight events, pt says " I am a very sick person", denies any abdominal pain, sob, chest pain or any other complaints, per nsg no new issues reported.         MEDICATIONS  (STANDING):  atorvastatin 20 milliGRAM(s) Oral at bedtime  chlorhexidine 2% Cloths 1 Application(s) Topical daily  dextrose 5%. 1000 milliLiter(s) (100 mL/Hr) IV Continuous <Continuous>  dextrose 5%. 1000 milliLiter(s) (50 mL/Hr) IV Continuous <Continuous>  dextrose 50% Injectable 25 Gram(s) IV Push once  dextrose 50% Injectable 12.5 Gram(s) IV Push once  dextrose 50% Injectable 25 Gram(s) IV Push once  glucagon  Injectable 1 milliGRAM(s) IntraMuscular once  insulin lispro (ADMELOG) corrective regimen sliding scale   SubCutaneous three times a day before meals  insulin lispro (ADMELOG) corrective regimen sliding scale   SubCutaneous at bedtime  levETIRAcetam   Injectable 500 milliGRAM(s) IV Push every 12 hours    MEDICATIONS  (PRN):  acetaminophen     Tablet .. 650 milliGRAM(s) Oral every 6 hours PRN Temp greater or equal to 38C (100.4F), Mild Pain (1 - 3)  dextrose Oral Gel 15 Gram(s) Oral once PRN Blood Glucose LESS THAN 70 milliGRAM(s)/deciliter      Vital Signs Last 24 Hrs  T(C): 36.6 (07 Jun 2025 12:00), Max: 36.7 (06 Jun 2025 16:00)  T(F): 97.9 (07 Jun 2025 12:00), Max: 98 (06 Jun 2025 16:00)  HR: 54 (07 Jun 2025 12:00) (54 - 68)  BP: 127/63 (07 Jun 2025 12:00) (109/52 - 148/70)  BP(mean): --  RR: 17 (07 Jun 2025 12:00) (17 - 18)  SpO2: 97% (07 Jun 2025 12:00) (95% - 98%)    Parameters below as of 07 Jun 2025 12:00  Patient On (Oxygen Delivery Method): room air      CAPILLARY BLOOD GLUCOSE      POCT Blood Glucose.: 142 mg/dL (07 Jun 2025 12:08)  POCT Blood Glucose.: 104 mg/dL (07 Jun 2025 08:39)  POCT Blood Glucose.: 138 mg/dL (06 Jun 2025 21:43)  POCT Blood Glucose.: 171 mg/dL (06 Jun 2025 17:04)    I&O's Summary      PHYSICAL EXAM:  GENERAL: NAD  CHEST/LUNG: Clear to auscultation bilaterally; No wheeze  HEART: Regular rate and rhythm  ABDOMEN: Soft, Nontender, Nondistended  EXTREMITIES: no LE edema  PSYCH: Calm  NEUROLOGY: AA, oriented to self, know that she is in the hospital, knows the month (June) but not year, slow speech  SKIN: dry and warm    LABS:                        14.2   7.10  )-----------( 199      ( 07 Jun 2025 05:07 )             42.3     06-07    140  |  106  |  28[H]  ----------------------------<  106[H]  4.2   |  20[L]  |  0.65    Ca    8.8      07 Jun 2025 05:07  Phos  3.7     06-07  Mg     2.20     06-07            Urinalysis Basic - ( 07 Jun 2025 05:07 )    Color: x / Appearance: x / SG: x / pH: x  Gluc: 106 mg/dL / Ketone: x  / Bili: x / Urobili: x   Blood: x / Protein: x / Nitrite: x   Leuk Esterase: x / RBC: x / WBC x   Sq Epi: x / Non Sq Epi: x / Bacteria: x        RADIOLOGY & ADDITIONAL TESTS:    Imaging Personally Reviewed:    Consultant(s) Notes Reviewed:      Care Discussed with Consultants/Other Providers:

## 2025-06-07 NOTE — DISCHARGE NOTE PROVIDER - NSDCMRMEDTOKEN_GEN_ALL_CORE_FT
Lipitor 20 mg oral tablet: 1 tab(s) orally once a day (at bedtime)  MetFORMIN (Eqv-Glucophage XR) 500 mg oral tablet, extended release: 1 tab(s) orally once a day   atorvastatin 20 mg oral tablet: 1 tab(s) orally once a day (at bedtime)  levETIRAcetam 100 mg/mL intravenous solution: 5 milliliter(s) intravenous every 12 hours  Lipitor 20 mg oral tablet: 1 tab(s) orally once a day (at bedtime)  MetFORMIN (Eqv-Glucophage XR) 500 mg oral tablet, extended release: 1 tab(s) orally once a day   atorvastatin 20 mg oral tablet: 1 tab(s) orally once a day (at bedtime)  levETIRAcetam 100 mg/mL intravenous solution: 5 milliliter(s) intravenous every 12 hours  Lipitor 20 mg oral tablet: 1 tab(s) orally once a day (at bedtime)  MetFORMIN (Eqv-Glucophage XR) 500 mg oral tablet, extended release: 1 tab(s) orally once a day [facility]   atorvastatin:   atorvastatin 20 mg oral tablet: 1 tab(s) orally once a day (at bedtime)  levETIRAcetam 100 mg/mL intravenous solution: 5 milliliter(s) intravenous every 12 hours  Lipitor 20 mg oral tablet: 1 tab(s) orally once a day (at bedtime)  metFORMIN:   MetFORMIN (Eqv-Glucophage XR) 500 mg oral tablet, extended release: 1 tab(s) orally once a day [facility]   atorvastatin 20 mg oral tablet: 1 tab(s) orally once a day (at bedtime)  levETIRAcetam 100 mg/mL intravenous solution: 5 milliliter(s) intravenous every 12 hours  MetFORMIN (Eqv-Glucophage XR) 500 mg oral tablet, extended release: 1 tab(s) orally once a day [facility]

## 2025-06-07 NOTE — DISCHARGE NOTE PROVIDER - PROVIDER TOKENS
FREE:[LAST:[MSK],PHONE:[(   )    -],FAX:[(   )    -],ADDRESS:[Transfered to [facility] [place]]] FREE:[LAST:[MSK],PHONE:[(   )    -],FAX:[(   )    -],ADDRESS:[Transfered to [facility] [place]PROVIDER:[TOKEN:[[id number]]]

## 2025-06-07 NOTE — DISCHARGE NOTE PROVIDER - HOSPITAL COURSE
85 y/o F with PMH of DM2, HTN, HLD presents brought in by family for concern regarding ams today. Patient at baseline is aox4, lives independently, and can take care of all her adls found to have brain metastases w/ Multiple enhancing lesions in the left cerebral hemisphere in the basal ganglia, corona radiata and centrum semiovale suspicious for enhancing neoplasm dominant lesion left corona radiata measuring 2.0 cm in AP diameter by 2.4 cm transversely by 2.3 cm in craniocaudal diameter.       Brain lesion.   -CT head w/ lesion left corona radiata measuring 2.0 cm in AP diameter by 2.4 cm transversely by 2.3 cm in craniocaudal diameter.  -obtain MRI head spine iv contrast -f/u results  - chest/a/p showed Innumerable sub-6 mm bilateral upper lung predominant groundglass nodules. Although indeterminate, most likely differential is inflammation or atypical infection. Dilated main pancreatic duct of the pancreatic body and tail with abrupt cut off at the proximal body. Recommend further evaluation with MR   abdomen.  - pulm consult appreciated, rvp neg, blood culture sent- f/u results, check sputum culture  - MRCP pending  -appreciate neurosx input, keppra 500 mg  oral BID  -fall precautions ambulate w/ assistance  -PT eval rec BRAYDEN.     Bradycardia.    Has sinus bradycardia at night time, now improving, tsh wnl  ekg with sinus arrythmia  cont to monitor.           85 y/o F with PMH of DM2, HTN, HLD brought in by family for concern regarding ams today. Patient at baseline is aox4, lives independently, and can take care of all her adls found to have brain metastases w/ Multiple enhancing lesions in the left cerebral hemisphere in the basal ganglia, corona radiata and centrum semiovale suspicious for enhancing neoplasm dominant lesion left corona radiata measuring 2.0 cm in AP diameter by 2.4 cm transversely by 2.3 cm in craniocaudal diameter.       Brain lesion.   -CT head w/ lesion left corona radiata measuring 2.0 cm in AP diameter by 2.4 cm transversely by 2.3 cm in craniocaudal diameter.  -obtain MRI head spine iv contrast -f/u results  - chest/a/p showed Innumerable sub-6 mm bilateral upper lung predominant groundglass nodules. Although indeterminate, most likely differential is inflammation or atypical infection. Dilated main pancreatic duct of the pancreatic body and tail with abrupt cut off at the proximal body. Recommend further evaluation with MR abdomen.  - pulm consult appreciated, rvp neg, blood culture sent- f/u results, check sputum culture  - MRCP pending  -appreciate neurosx input, keppra 500 mg  oral BID  -fall precautions ambulate w/ assistance  -PT eval rec [facility]RAYDEN.     Bradycardia.    Has sinus bradycardia at night time, now improving, tsh wnl  ekg with sinus arrythmia  cont to monitor.    Family arranged for Transfer to [facility] [place] for further management, 2nd opinion         HPI:  83 y/o F with PMH of DM2, HTN, HLD presents brought in by family for concern regarding ams today. Patient at baseline is aox4, lives independently, and can take care of all her adls. Daughter spoke to her last night at 9:45pm and she was talking at her normal mental baseline at that time. Patient is a/o times 1-2 currently. CT scan found w/ multiple brain lesions. Per daughter over the phone, patient has had routine mammogram/pap smear/ and colonoscopies without major findings.     (05 Jun 2025 01:28)    Hospital Course:  84F PMH DM2, HTN, HLD who presented for altered mental status (baseline is aox4, lives independently, and can take care of all her adls) found to have word finding difficulty 2/2 brain lesions c/f primary vs metastatic cancer. CT head w/ lesion left corona radiata measuring 2.0 cm in AP diameter by 2.4 cm transversely by 2.3 cm in craniocaudal diameter. MRI head: Multifocal left hemispheric enhancing lesions.  Small right temporal meningioma. Chest/a/p showed Innumerable sub-6 mm bilateral upper lung predominant groundglass nodules. Although indeterminate, most likely differential is inflammation or atypical infection. Dilated main pancreatic duct of the pancreatic body and tail with abrupt cut off at the proximal body. Recommend further evaluation with MR abdomen. RVP neg, blood culture neg - unclear cause of ground glass nodules on CT chest, pulm recs repeat in 6mo to 1 year. MRCP pending. Neurosurgery consulted. Started on keppra 500 mg  oral BID. Discussed with daughter Noy and requesting transfer to [place] for second opinion prior to making treatment decisions.     Important Medication Changes and Reason: Started on keppra 500 mg  oral BID for brain mets    Active or Pending Issues Requiring Follow-up: f/u [facility] [place] for cancer treatment options    Advanced Directives:   [ x] Full code  [ ] DNR  [ ] Hospice    Discharge Diagnoses:  aphasia with word finding difficulty 2/2 brain lesions c/f primary vs metastatic cancer  DM2, HTN, HLD  bilateral upper lung predominant groundglass nodules  Dilated main pancreatic duct      Family arranged for Transfer to [facility] [place] for further management, 2nd opinion

## 2025-06-07 NOTE — PROVIDER CONTACT NOTE (OTHER) - BACKGROUND
Admitted for stroke workup. PMH DM, HTN.

## 2025-06-07 NOTE — DISCHARGE NOTE PROVIDER - CARE PROVIDER_API CALL
MSK,   Transfered to [facility] [place]  Phone: (   )    -  Fax: (   )    -  Follow Up Time:    [facility] [place],   Transfered [place] [facility] [place]  Phone: (   )    -  Fax: (   )    -  Follow Up Time:     MAXIME RUANO  [address] [place] [address]  Phone: ()-  Fax: ()-  Follow Up Time:

## 2025-06-08 LAB
ANION GAP SERPL CALC-SCNC: 13 MMOL/L — SIGNIFICANT CHANGE UP (ref 7–14)
BUN SERPL-MCNC: 27 MG/DL — HIGH (ref 7–23)
CALCIUM SERPL-MCNC: 9.1 MG/DL — SIGNIFICANT CHANGE UP (ref 8.4–10.5)
CHLORIDE SERPL-SCNC: 104 MMOL/L — SIGNIFICANT CHANGE UP (ref 98–107)
CO2 SERPL-SCNC: 21 MMOL/L — LOW (ref 22–31)
CREAT SERPL-MCNC: 0.79 MG/DL — SIGNIFICANT CHANGE UP (ref 0.5–1.3)
EGFR: 74 ML/MIN/1.73M2 — SIGNIFICANT CHANGE UP
EGFR: 74 ML/MIN/1.73M2 — SIGNIFICANT CHANGE UP
GLUCOSE BLDC GLUCOMTR-MCNC: 102 MG/DL — HIGH (ref 70–99)
GLUCOSE BLDC GLUCOMTR-MCNC: 113 MG/DL — HIGH (ref 70–99)
GLUCOSE BLDC GLUCOMTR-MCNC: 195 MG/DL — HIGH (ref 70–99)
GLUCOSE BLDC GLUCOMTR-MCNC: 214 MG/DL — HIGH (ref 70–99)
GLUCOSE SERPL-MCNC: 118 MG/DL — HIGH (ref 70–99)
HCT VFR BLD CALC: 40.5 % — SIGNIFICANT CHANGE UP (ref 34.5–45)
HGB BLD-MCNC: 13.5 G/DL — SIGNIFICANT CHANGE UP (ref 11.5–15.5)
MAGNESIUM SERPL-MCNC: 2.2 MG/DL — SIGNIFICANT CHANGE UP (ref 1.6–2.6)
MCHC RBC-ENTMCNC: 30 PG — SIGNIFICANT CHANGE UP (ref 27–34)
MCHC RBC-ENTMCNC: 33.3 G/DL — SIGNIFICANT CHANGE UP (ref 32–36)
MCV RBC AUTO: 90 FL — SIGNIFICANT CHANGE UP (ref 80–100)
NRBC # BLD AUTO: 0 K/UL — SIGNIFICANT CHANGE UP (ref 0–0)
NRBC # FLD: 0 K/UL — SIGNIFICANT CHANGE UP (ref 0–0)
NRBC BLD AUTO-RTO: 0 /100 WBCS — SIGNIFICANT CHANGE UP (ref 0–0)
PHOSPHATE SERPL-MCNC: 3.8 MG/DL — SIGNIFICANT CHANGE UP (ref 2.5–4.5)
PLATELET # BLD AUTO: 212 K/UL — SIGNIFICANT CHANGE UP (ref 150–400)
POTASSIUM SERPL-MCNC: 4.4 MMOL/L — SIGNIFICANT CHANGE UP (ref 3.5–5.3)
POTASSIUM SERPL-SCNC: 4.4 MMOL/L — SIGNIFICANT CHANGE UP (ref 3.5–5.3)
RBC # BLD: 4.5 M/UL — SIGNIFICANT CHANGE UP (ref 3.8–5.2)
RBC # FLD: 13.2 % — SIGNIFICANT CHANGE UP (ref 10.3–14.5)
SODIUM SERPL-SCNC: 138 MMOL/L — SIGNIFICANT CHANGE UP (ref 135–145)
WBC # BLD: 8.42 K/UL — SIGNIFICANT CHANGE UP (ref 3.8–10.5)
WBC # FLD AUTO: 8.42 K/UL — SIGNIFICANT CHANGE UP (ref 3.8–10.5)

## 2025-06-08 PROCEDURE — 99232 SBSQ HOSP IP/OBS MODERATE 35: CPT

## 2025-06-08 PROCEDURE — 93010 ELECTROCARDIOGRAM REPORT: CPT

## 2025-06-08 RX ADMIN — ATORVASTATIN CALCIUM 20 MILLIGRAM(S): 80 TABLET, FILM COATED ORAL at 21:21

## 2025-06-08 RX ADMIN — LEVETIRACETAM 500 MILLIGRAM(S): 10 INJECTION, SOLUTION INTRAVENOUS at 06:30

## 2025-06-08 RX ADMIN — INSULIN LISPRO 2: 100 INJECTION, SOLUTION INTRAVENOUS; SUBCUTANEOUS at 11:35

## 2025-06-08 RX ADMIN — Medication 1 APPLICATION(S): at 11:22

## 2025-06-08 RX ADMIN — LEVETIRACETAM 500 MILLIGRAM(S): 10 INJECTION, SOLUTION INTRAVENOUS at 17:50

## 2025-06-08 NOTE — PROGRESS NOTE ADULT - SUBJECTIVE AND OBJECTIVE BOX
Patient is a 84y old  Female who presents with a chief complaint of brain lesions (07 Jun 2025 17:02)      SUBJECTIVE / OVERNIGHT EVENTS: Pt seen and examined at 10:58am, no overnight events, Andrea overnight while asleep per nsg, pt sitting on the bed, denies any abdominal pain, sob, chest pain or any other complaints,  no other new issues reported.     MEDICATIONS  (STANDING):  atorvastatin 20 milliGRAM(s) Oral at bedtime  chlorhexidine 2% Cloths 1 Application(s) Topical daily  dextrose 5%. 1000 milliLiter(s) (100 mL/Hr) IV Continuous <Continuous>  dextrose 5%. 1000 milliLiter(s) (50 mL/Hr) IV Continuous <Continuous>  dextrose 50% Injectable 25 Gram(s) IV Push once  dextrose 50% Injectable 12.5 Gram(s) IV Push once  dextrose 50% Injectable 25 Gram(s) IV Push once  glucagon  Injectable 1 milliGRAM(s) IntraMuscular once  insulin lispro (ADMELOG) corrective regimen sliding scale   SubCutaneous three times a day before meals  insulin lispro (ADMELOG) corrective regimen sliding scale   SubCutaneous at bedtime  levETIRAcetam   Injectable 500 milliGRAM(s) IV Push every 12 hours    MEDICATIONS  (PRN):  acetaminophen     Tablet .. 650 milliGRAM(s) Oral every 6 hours PRN Temp greater or equal to 38C (100.4F), Mild Pain (1 - 3)  dextrose Oral Gel 15 Gram(s) Oral once PRN Blood Glucose LESS THAN 70 milliGRAM(s)/deciliter      Vital Signs Last 24 Hrs  T(C): 36.5 (08 Jun 2025 12:00), Max: 36.8 (08 Jun 2025 08:00)  T(F): 97.7 (08 Jun 2025 12:00), Max: 98.2 (08 Jun 2025 08:00)  HR: 89 (08 Jun 2025 12:00) (60 - 89)  BP: 143/85 (08 Jun 2025 12:00) (122/74 - 155/75)  BP(mean): --  RR: 18 (08 Jun 2025 12:00) (16 - 18)  SpO2: 100% (08 Jun 2025 12:00) (97% - 100%)    Parameters below as of 08 Jun 2025 12:00  Patient On (Oxygen Delivery Method): room air      CAPILLARY BLOOD GLUCOSE      POCT Blood Glucose.: 214 mg/dL (08 Jun 2025 11:21)  POCT Blood Glucose.: 113 mg/dL (08 Jun 2025 08:24)  POCT Blood Glucose.: 151 mg/dL (07 Jun 2025 20:54)  POCT Blood Glucose.: 107 mg/dL (07 Jun 2025 17:46)    I&O's Summary      PHYSICAL EXAM:  GENERAL: NAD  CHEST/LUNG: Clear to auscultation bilaterally; No wheeze  HEART: Regular rate and rhythm  ABDOMEN: Soft, Nontender, Nondistended  EXTREMITIES: no LE edema  PSYCH: Calm  NEUROLOGY: AA, oriented to self, know that she is in the hospital, knows the month (June) but not year, slow speech  SKIN: dry and warm    LABS:                        13.5   8.42  )-----------( 212      ( 08 Jun 2025 03:37 )             40.5     06-08    138  |  104  |  27[H]  ----------------------------<  118[H]  4.4   |  21[L]  |  0.79    Ca    9.1      08 Jun 2025 03:37  Phos  3.8     06-08  Mg     2.20     06-08            Urinalysis Basic - ( 08 Jun 2025 03:37 )    Color: x / Appearance: x / SG: x / pH: x  Gluc: 118 mg/dL / Ketone: x  / Bili: x / Urobili: x   Blood: x / Protein: x / Nitrite: x   Leuk Esterase: x / RBC: x / WBC x   Sq Epi: x / Non Sq Epi: x / Bacteria: x        RADIOLOGY & ADDITIONAL TESTS:  < from: MR Head w/wo IV Cont (06.07.25 @ 13:26) >  MR BRAIN    < end of copied text >  < from: MR Head w/wo IV Cont (06.07.25 @ 13:26) >    1.  Multifocal left hemispheric enhancing lesions.  2.  Small right temporal meningioma.    < end of copied text >    Imaging Personally Reviewed:    Consultant(s) Notes Reviewed:      Care Discussed with Consultants/Other Providers:

## 2025-06-08 NOTE — CHART NOTE - NSCHARTNOTEFT_GEN_A_CORE
Spoke to Neurosurgery, They will Follow up with MRI Brain and make further recommendations  if needed.

## 2025-06-08 NOTE — CHART NOTE - NSCHARTNOTEFT_GEN_A_CORE
MRI Brain w/w/o contrast stereotactic reviewed:    There are multiple ring-enhancing necrotic lesions in the left cerebral hemisphere, corresponding to the CT and CTA abnormalities. An index lesion at the left supramarginal gyrus measures 3.5 x 2.9 x 2.6 cm. There is restricted diffusion in the enhancing component of the tumors, consistent with high cellularity. There are a few foci of intratumoral hemorrhage within the lesions, without calcification on the CT. There are markedly elevated rCBV values on the CTP, consistent with high-grade neoplasm.    The pattern is nonspecific and differential diagnostic considerations would include metastatic disease, CNS lymphoma, and multifocal glioma.    A heavily calcified meningioma vs bony spur is noted overlying the right temporal lobe, with minimal peripheral enhancement. There is no associated vasogenic edema in the subjacent brain parenchyma. There are no enhancing right hemispheric lesions. There is no leptomeningeal spread of neoplasm.      ------  Would recommend follow up with NSGY and Neurooncology for further recommendations:   Neurooncology Dr. Mcduffie [phone], [address] [place] [address], [place] [address]    Case discussed with attending. General neurology to sign off at this time.

## 2025-06-09 LAB
ANION GAP SERPL CALC-SCNC: 13 MMOL/L — SIGNIFICANT CHANGE UP (ref 7–14)
BUN SERPL-MCNC: 23 MG/DL — SIGNIFICANT CHANGE UP (ref 7–23)
CALCIUM SERPL-MCNC: 8.8 MG/DL — SIGNIFICANT CHANGE UP (ref 8.4–10.5)
CHLORIDE SERPL-SCNC: 106 MMOL/L — SIGNIFICANT CHANGE UP (ref 98–107)
CO2 SERPL-SCNC: 20 MMOL/L — LOW (ref 22–31)
CREAT SERPL-MCNC: 0.61 MG/DL — SIGNIFICANT CHANGE UP (ref 0.5–1.3)
EGFR: 88 ML/MIN/1.73M2 — SIGNIFICANT CHANGE UP
EGFR: 88 ML/MIN/1.73M2 — SIGNIFICANT CHANGE UP
GLUCOSE BLDC GLUCOMTR-MCNC: 103 MG/DL — HIGH (ref 70–99)
GLUCOSE BLDC GLUCOMTR-MCNC: 131 MG/DL — HIGH (ref 70–99)
GLUCOSE BLDC GLUCOMTR-MCNC: 235 MG/DL — HIGH (ref 70–99)
GLUCOSE BLDC GLUCOMTR-MCNC: 97 MG/DL — SIGNIFICANT CHANGE UP (ref 70–99)
GLUCOSE SERPL-MCNC: 95 MG/DL — SIGNIFICANT CHANGE UP (ref 70–99)
HCT VFR BLD CALC: 41.1 % — SIGNIFICANT CHANGE UP (ref 34.5–45)
HGB BLD-MCNC: 13.9 G/DL — SIGNIFICANT CHANGE UP (ref 11.5–15.5)
MAGNESIUM SERPL-MCNC: 2.2 MG/DL — SIGNIFICANT CHANGE UP (ref 1.6–2.6)
MCHC RBC-ENTMCNC: 30 PG — SIGNIFICANT CHANGE UP (ref 27–34)
MCHC RBC-ENTMCNC: 33.8 G/DL — SIGNIFICANT CHANGE UP (ref 32–36)
MCV RBC AUTO: 88.8 FL — SIGNIFICANT CHANGE UP (ref 80–100)
NRBC # BLD AUTO: 0 K/UL — SIGNIFICANT CHANGE UP (ref 0–0)
NRBC # FLD: 0 K/UL — SIGNIFICANT CHANGE UP (ref 0–0)
NRBC BLD AUTO-RTO: 0 /100 WBCS — SIGNIFICANT CHANGE UP (ref 0–0)
PHOSPHATE SERPL-MCNC: 3.4 MG/DL — SIGNIFICANT CHANGE UP (ref 2.5–4.5)
PLATELET # BLD AUTO: 219 K/UL — SIGNIFICANT CHANGE UP (ref 150–400)
POTASSIUM SERPL-MCNC: 4.1 MMOL/L — SIGNIFICANT CHANGE UP (ref 3.5–5.3)
POTASSIUM SERPL-SCNC: 4.1 MMOL/L — SIGNIFICANT CHANGE UP (ref 3.5–5.3)
RBC # BLD: 4.63 M/UL — SIGNIFICANT CHANGE UP (ref 3.8–5.2)
RBC # FLD: 13.4 % — SIGNIFICANT CHANGE UP (ref 10.3–14.5)
SODIUM SERPL-SCNC: 139 MMOL/L — SIGNIFICANT CHANGE UP (ref 135–145)
WBC # BLD: 7.29 K/UL — SIGNIFICANT CHANGE UP (ref 3.8–10.5)
WBC # FLD AUTO: 7.29 K/UL — SIGNIFICANT CHANGE UP (ref 3.8–10.5)

## 2025-06-09 PROCEDURE — 99233 SBSQ HOSP IP/OBS HIGH 50: CPT | Mod: GC

## 2025-06-09 PROCEDURE — 99233 SBSQ HOSP IP/OBS HIGH 50: CPT

## 2025-06-09 RX ORDER — LEVETIRACETAM 10 MG/ML
5 INJECTION, SOLUTION INTRAVENOUS
Qty: 0 | Refills: 0 | DISCHARGE
Start: 2025-06-09

## 2025-06-09 RX ORDER — ATORVASTATIN CALCIUM 80 MG/1
1 TABLET, FILM COATED ORAL
Qty: 0 | Refills: 0 | DISCHARGE
Start: 2025-06-09

## 2025-06-09 RX ADMIN — Medication 1 APPLICATION(S): at 12:12

## 2025-06-09 RX ADMIN — ATORVASTATIN CALCIUM 20 MILLIGRAM(S): 80 TABLET, FILM COATED ORAL at 22:23

## 2025-06-09 RX ADMIN — LEVETIRACETAM 500 MILLIGRAM(S): 10 INJECTION, SOLUTION INTRAVENOUS at 17:36

## 2025-06-09 RX ADMIN — LEVETIRACETAM 500 MILLIGRAM(S): 10 INJECTION, SOLUTION INTRAVENOUS at 05:50

## 2025-06-09 RX ADMIN — INSULIN LISPRO 0: 100 INJECTION, SOLUTION INTRAVENOUS; SUBCUTANEOUS at 22:22

## 2025-06-09 NOTE — PROGRESS NOTE ADULT - SUBJECTIVE AND OBJECTIVE BOX
Steve Saurez MD  Division of Hospitalist Medicine  Pager [phone]  Available on Teams    CC: Patient is a 84y old  Female who presents with a chief complaint of brain lesions (08 Jun 2025 13:52)        SUBJECTIVE / INTERVAL HPI: Patient seen and examined at bedside. Pt continues to have word finding difficulty but otherwise no acute complaints. Pt denies fevers, chills, chest pain, shortness of breath, cough, abdominal pain, nausea, vomiting, diarrhea, leg pain/swelling.     ROS: negative unless otherwise stated above.    VITAL SIGNS:  Vital Signs Last 24 Hrs  T(C): 36.9 (09 Jun 2025 08:00), Max: 36.9 (09 Jun 2025 08:00)  T(F): 98.4 (09 Jun 2025 08:00), Max: 98.4 (09 Jun 2025 08:00)  HR: 61 (09 Jun 2025 08:00) (55 - 65)  BP: 137/86 (09 Jun 2025 08:00) (130/81 - 159/60)  BP(mean): --  RR: 18 (09 Jun 2025 08:00) (17 - 18)  SpO2: 100% (09 Jun 2025 08:00) (96% - 100%)    Parameters below as of 09 Jun 2025 08:00  Patient On (Oxygen Delivery Method): room air          06-08-25 @ 07:01  -  06-09-25 @ 07:00  --------------------------------------------------------  IN: 0 mL / OUT: 400 mL / NET: -400 mL        PHYSICAL EXAM:    General: NAD  HEENT: MMM  Neck: supple  Cardiovascular: +S1/S2; RRR  Respiratory: CTA B/L; no W/R/R  Gastrointestinal: soft, NT/ND  Extremities: WWP; no edema, clubbing or cyanosis  Neuro: AAOx3; word finding difficulty; CN 2-12 intact; strength 5/5 and symmetric in elbow flexion/extension, wrist flexion/extension, finger squeeze, hip flexion, ankle dorsi/plantarflexion; sensation to light touch symmetric in b/l UEs and LEs; coordination intact to finger to nose b/l    MEDICATIONS:  MEDICATIONS  (STANDING):  atorvastatin 20 milliGRAM(s) Oral at bedtime  chlorhexidine 2% Cloths 1 Application(s) Topical daily  dextrose 5%. 1000 milliLiter(s) (100 mL/Hr) IV Continuous <Continuous>  dextrose 5%. 1000 milliLiter(s) (50 mL/Hr) IV Continuous <Continuous>  dextrose 50% Injectable 25 Gram(s) IV Push once  dextrose 50% Injectable 12.5 Gram(s) IV Push once  dextrose 50% Injectable 25 Gram(s) IV Push once  glucagon  Injectable 1 milliGRAM(s) IntraMuscular once  insulin lispro (ADMELOG) corrective regimen sliding scale   SubCutaneous three times a day before meals  insulin lispro (ADMELOG) corrective regimen sliding scale   SubCutaneous at bedtime  levETIRAcetam   Injectable 500 milliGRAM(s) IV Push every 12 hours    MEDICATIONS  (PRN):  acetaminophen     Tablet .. 650 milliGRAM(s) Oral every 6 hours PRN Temp greater or equal to 38C (100.4F), Mild Pain (1 - 3)  dextrose Oral Gel 15 Gram(s) Oral once PRN Blood Glucose LESS THAN 70 milliGRAM(s)/deciliter      ALLERGIES:  Allergies    No Known Allergies    Intolerances        LABS:                        13.9   7.29  )-----------( 219      ( 09 Jun 2025 04:16 )             41.1     06-09    139  |  106  |  23  ----------------------------<  95  4.1   |  20[L]  |  0.61    Ca    8.8      09 Jun 2025 04:16  Phos  3.4     06-09  Mg     2.20     06-09        Urinalysis Basic - ( 09 Jun 2025 04:16 )    Color: x / Appearance: x / SG: x / pH: x  Gluc: 95 mg/dL / Ketone: x  / Bili: x / Urobili: x   Blood: x / Protein: x / Nitrite: x   Leuk Esterase: x / RBC: x / WBC x   Sq Epi: x / Non Sq Epi: x / Bacteria: x      CAPILLARY BLOOD GLUCOSE      POCT Blood Glucose.: 131 mg/dL (09 Jun 2025 12:14)      RADIOLOGY & ADDITIONAL TESTS: Reviewed.

## 2025-06-09 NOTE — PROGRESS NOTE ADULT - ATTENDING COMMENTS
84 year old female who presented with encephalopathy found to have multiple ring enhancing demetri lesiosn concerning for malignancy. Additionally, during her evaluation she was noted to have multiple sub cm (6 mm) ground glass pulmonary nodules for which pulmonary consulted.     We have no prior imaging for comparison. The lesions are too small to sample. And overall, low suspicion that this is related to her malgnancy. That being said, she is high risk and hsould have interval imaging as noted above.   Low suspicion that this is infectious and that work up has been negative as well.     Can follow up as outpatient with pulmonary, would favor repeat CT Chest in this high risk patient with multiple 6 mm gg nodules at 6 mo- 1 year (Guidelines 2-3 years).   Rest of care as per neurosurgery and oncology.   Rest as above.

## 2025-06-09 NOTE — CHART NOTE - NSCHARTNOTEFT_GEN_A_CORE
Extensive discussion had with daughter and patient.  Explained the imaging findings (multifocal lesions, likely primary malignancy vs metastatic disease) and conveyed the options which are biopsy followed by treatment.  Explained biopsy would be for the purposes of diagnosis but patient would likely have to go through chemoradiation if malignant.  Alternatively, palliative care and symptomatic management is an option.  Patient and family stated understanding and requested palliative care consult and no surgery at this time.

## 2025-06-09 NOTE — CHART NOTE - NSCHARTNOTEFT_GEN_A_CORE
Pt being transferred to [facility]. Dr. Christopher discussed with pt's daughter  No neurosurgical intervention at this time. Neurosurgery signing off. Reconsult PRN. Pt can follow up outpatient with  ___Case d/w attending. Pt being transferred to [facility] for a second opinion. Dr. Christopher discussed with pt's daughter, ultimately no neurosurgical intervention at this time given pt poor surgical candidate.  Neurosurgery signing off. Reconsult PRN Pt being transferred to [facility]. Patient family and patient and Dr. Christopher discussed options of biopsy vs. palliative care.  Purposes of biopsy would be for diagnosis which would likely be followed by chemoradiation.  Given age and significant aphasia, family requested palliative initially, but not requests transfer to [facility].

## 2025-06-09 NOTE — PROGRESS NOTE ADULT - SUBJECTIVE AND OBJECTIVE BOX
CHIEF COMPLAINT:Patient is a 84y old  Female who presents with a chief complaint of brain lesions (09 Jun 2025 12:51)      Interval Events:    REVIEW OF SYSTEMS:  [x] All other systems negative except per HPI   [ ] Unable to assess ROS because ________    OBJECTIVE:  ICU Vital Signs Last 24 Hrs  T(C): 36.9 (09 Jun 2025 08:00), Max: 36.9 (09 Jun 2025 08:00)  T(F): 98.4 (09 Jun 2025 08:00), Max: 98.4 (09 Jun 2025 08:00)  HR: 61 (09 Jun 2025 08:00) (55 - 65)  BP: 137/86 (09 Jun 2025 08:00) (130/81 - 159/60)  BP(mean): --  ABP: --  ABP(mean): --  RR: 18 (09 Jun 2025 08:00) (17 - 18)  SpO2: 100% (09 Jun 2025 08:00) (96% - 100%)    O2 Parameters below as of 09 Jun 2025 08:00  Patient On (Oxygen Delivery Method): room air              06-08 @ 07:01  -  06-09 @ 07:00  --------------------------------------------------------  IN: 0 mL / OUT: 400 mL / NET: -400 mL        PHYSICAL EXAM:  GENERAL: NAD, well-groomed, well-developed  HEAD:  Atraumatic, Normocephalic  EYES: EOMI, PERRLA, conjunctiva and sclera clear  ENMT: No tonsillar erythema, exudates, or enlargement; Moist mucous membranes, Good dentition, No lesions  NECK: Supple, No JVD, Normal thyroid  CHEST/LUNG: Clear to auscultation bilaterally; No rales, rhonchi, wheezing, or rubs  HEART: Regular rate and rhythm; No murmurs, rubs, or gallops  ABDOMEN: Soft, Nontender, Nondistended; Bowel sounds present  VASCULAR:  2+ Peripheral Pulses, No clubbing, cyanosis, or edema  LYMPH: No lymphadenopathy noted  SKIN: No rashes or lesions  NERVOUS SYSTEM:  Alert & Oriented X3, Good concentration; Motor Strength 5/5 B/L upper and lower extremities; DTRs 2+ intact and symmetric    HOSPITAL MEDICATIONS:  MEDICATIONS  (STANDING):  atorvastatin 20 milliGRAM(s) Oral at bedtime  chlorhexidine 2% Cloths 1 Application(s) Topical daily  dextrose 5%. 1000 milliLiter(s) (100 mL/Hr) IV Continuous <Continuous>  dextrose 5%. 1000 milliLiter(s) (50 mL/Hr) IV Continuous <Continuous>  dextrose 50% Injectable 25 Gram(s) IV Push once  dextrose 50% Injectable 12.5 Gram(s) IV Push once  dextrose 50% Injectable 25 Gram(s) IV Push once  glucagon  Injectable 1 milliGRAM(s) IntraMuscular once  insulin lispro (ADMELOG) corrective regimen sliding scale   SubCutaneous three times a day before meals  insulin lispro (ADMELOG) corrective regimen sliding scale   SubCutaneous at bedtime  levETIRAcetam   Injectable 500 milliGRAM(s) IV Push every 12 hours    MEDICATIONS  (PRN):  acetaminophen     Tablet .. 650 milliGRAM(s) Oral every 6 hours PRN Temp greater or equal to 38C (100.4F), Mild Pain (1 - 3)  dextrose Oral Gel 15 Gram(s) Oral once PRN Blood Glucose LESS THAN 70 milliGRAM(s)/deciliter      LABS:    The Labs were reviewed by me   The Radiology was reviewed by me    EKG tracing reviewed by me    06-09    139  |  106  |  23  ----------------------------<  95  4.1   |  20[L]  |  0.61  06-08    138  |  104  |  27[H]  ----------------------------<  118[H]  4.4   |  21[L]  |  0.79  06-07    140  |  106  |  28[H]  ----------------------------<  106[H]  4.2   |  20[L]  |  0.65    Ca    8.8      09 Jun 2025 04:16  Ca    9.1      08 Jun 2025 03:37  Ca    8.8      07 Jun 2025 05:07  Phos  3.4     06-09  Mg     2.20     06-09      Magnesium: 2.20 mg/dL (06-09-25 @ 04:16)  Magnesium: 2.20 mg/dL (06-08-25 @ 03:37)  Magnesium: 2.20 mg/dL (06-07-25 @ 05:07)    Phosphorus: 3.4 mg/dL (06-09-25 @ 04:16)  Phosphorus: 3.8 mg/dL (06-08-25 @ 03:37)  Phosphorus: 3.7 mg/dL (06-07-25 @ 05:07)                    Urinalysis Basic - ( 09 Jun 2025 04:16 )    Color: x / Appearance: x / SG: x / pH: x  Gluc: 95 mg/dL / Ketone: x  / Bili: x / Urobili: x   Blood: x / Protein: x / Nitrite: x   Leuk Esterase: x / RBC: x / WBC x   Sq Epi: x / Non Sq Epi: x / Bacteria: x                              13.9   7.29  )-----------( 219      ( 09 Jun 2025 04:16 )             41.1                         13.5   8.42  )-----------( 212      ( 08 Jun 2025 03:37 )             40.5                         14.2   7.10  )-----------( 199      ( 07 Jun 2025 05:07 )             42.3     CAPILLARY BLOOD GLUCOSE      POCT Blood Glucose.: 131 mg/dL (09 Jun 2025 12:14)  POCT Blood Glucose.: 97 mg/dL (09 Jun 2025 08:02)  POCT Blood Glucose.: 195 mg/dL (08 Jun 2025 21:20)  POCT Blood Glucose.: 102 mg/dL (08 Jun 2025 17:33)        MICROBIOLOGY:     RADIOLOGY:  [ ] Reviewed and interpreted by me    Point of Care Ultrasound Findings:    PFT:    EKG: CHIEF COMPLAINT:Patient is a 84y old  Female who presents with a chief complaint of brain lesions (09 Jun 2025 12:51)      Interval Events: breathing stable    REVIEW OF SYSTEMS:  [x] All other systems negative except per HPI   [ ] Unable to assess ROS because ________    OBJECTIVE:  ICU Vital Signs Last 24 Hrs  T(C): 36.9 (09 Jun 2025 08:00), Max: 36.9 (09 Jun 2025 08:00)  T(F): 98.4 (09 Jun 2025 08:00), Max: 98.4 (09 Jun 2025 08:00)  HR: 61 (09 Jun 2025 08:00) (55 - 65)  BP: 137/86 (09 Jun 2025 08:00) (130/81 - 159/60)  BP(mean): --  ABP: --  ABP(mean): --  RR: 18 (09 Jun 2025 08:00) (17 - 18)  SpO2: 100% (09 Jun 2025 08:00) (96% - 100%)    O2 Parameters below as of 09 Jun 2025 08:00  Patient On (Oxygen Delivery Method): room air              06-08 @ 07:01  -  06-09 @ 07:00  --------------------------------------------------------  IN: 0 mL / OUT: 400 mL / NET: -400 mL        PHYSICAL EXAM:  GENERAL: NAD, well-groomed, well-developed  HEAD:  Atraumatic, Normocephalic  EYES: EOMI, PERRLA, conjunctiva and sclera clear  ENMT: No tonsillar erythema, exudates, or enlargement; Moist mucous membranes, Good dentition, No lesions  NECK: Supple, No JVD, Normal thyroid  CHEST/LUNG: Clear to auscultation bilaterally; No rales, rhonchi, wheezing, or rubs  HEART: Regular rate and rhythm; No murmurs, rubs, or gallops  ABDOMEN: Soft, Nontender, Nondistended; Bowel sounds present  VASCULAR:  2+ Peripheral Pulses, No clubbing, cyanosis, or edema  LYMPH: No lymphadenopathy noted  SKIN: No rashes or lesions  NERVOUS SYSTEM:  Alert & Oriented X3, Good concentration; Motor Strength 5/5 B/L upper and lower extremities; DTRs 2+ intact and symmetric    HOSPITAL MEDICATIONS:  MEDICATIONS  (STANDING):  atorvastatin 20 milliGRAM(s) Oral at bedtime  chlorhexidine 2% Cloths 1 Application(s) Topical daily  dextrose 5%. 1000 milliLiter(s) (100 mL/Hr) IV Continuous <Continuous>  dextrose 5%. 1000 milliLiter(s) (50 mL/Hr) IV Continuous <Continuous>  dextrose 50% Injectable 25 Gram(s) IV Push once  dextrose 50% Injectable 12.5 Gram(s) IV Push once  dextrose 50% Injectable 25 Gram(s) IV Push once  glucagon  Injectable 1 milliGRAM(s) IntraMuscular once  insulin lispro (ADMELOG) corrective regimen sliding scale   SubCutaneous three times a day before meals  insulin lispro (ADMELOG) corrective regimen sliding scale   SubCutaneous at bedtime  levETIRAcetam   Injectable 500 milliGRAM(s) IV Push every 12 hours    MEDICATIONS  (PRN):  acetaminophen     Tablet .. 650 milliGRAM(s) Oral every 6 hours PRN Temp greater or equal to 38C (100.4F), Mild Pain (1 - 3)  dextrose Oral Gel 15 Gram(s) Oral once PRN Blood Glucose LESS THAN 70 milliGRAM(s)/deciliter      LABS:    The Labs were reviewed by me   The Radiology was reviewed by me    EKG tracing reviewed by me    06-09    139  |  106  |  23  ----------------------------<  95  4.1   |  20[L]  |  0.61  06-08    138  |  104  |  27[H]  ----------------------------<  118[H]  4.4   |  21[L]  |  0.79  06-07    140  |  106  |  28[H]  ----------------------------<  106[H]  4.2   |  20[L]  |  0.65    Ca    8.8      09 Jun 2025 04:16  Ca    9.1      08 Jun 2025 03:37  Ca    8.8      07 Jun 2025 05:07  Phos  3.4     06-09  Mg     2.20     06-09      Magnesium: 2.20 mg/dL (06-09-25 @ 04:16)  Magnesium: 2.20 mg/dL (06-08-25 @ 03:37)  Magnesium: 2.20 mg/dL (06-07-25 @ 05:07)    Phosphorus: 3.4 mg/dL (06-09-25 @ 04:16)  Phosphorus: 3.8 mg/dL (06-08-25 @ 03:37)  Phosphorus: 3.7 mg/dL (06-07-25 @ 05:07)                    Urinalysis Basic - ( 09 Jun 2025 04:16 )    Color: x / Appearance: x / SG: x / pH: x  Gluc: 95 mg/dL / Ketone: x  / Bili: x / Urobili: x   Blood: x / Protein: x / Nitrite: x   Leuk Esterase: x / RBC: x / WBC x   Sq Epi: x / Non Sq Epi: x / Bacteria: x                              13.9   7.29  )-----------( 219      ( 09 Jun 2025 04:16 )             41.1                         13.5   8.42  )-----------( 212      ( 08 Jun 2025 03:37 )             40.5                         14.2   7.10  )-----------( 199      ( 07 Jun 2025 05:07 )             42.3     CAPILLARY BLOOD GLUCOSE      POCT Blood Glucose.: 131 mg/dL (09 Jun 2025 12:14)  POCT Blood Glucose.: 97 mg/dL (09 Jun 2025 08:02)  POCT Blood Glucose.: 195 mg/dL (08 Jun 2025 21:20)  POCT Blood Glucose.: 102 mg/dL (08 Jun 2025 17:33)        MICROBIOLOGY:     RADIOLOGY:  [ ] Reviewed and interpreted by me    Point of Care Ultrasound Findings:    PFT:    EKG:

## 2025-06-10 LAB
GLUCOSE BLDC GLUCOMTR-MCNC: 129 MG/DL — HIGH (ref 70–99)
GLUCOSE BLDC GLUCOMTR-MCNC: 133 MG/DL — HIGH (ref 70–99)
GLUCOSE BLDC GLUCOMTR-MCNC: 148 MG/DL — HIGH (ref 70–99)
GLUCOSE BLDC GLUCOMTR-MCNC: 93 MG/DL — SIGNIFICANT CHANGE UP (ref 70–99)

## 2025-06-10 PROCEDURE — 99232 SBSQ HOSP IP/OBS MODERATE 35: CPT

## 2025-06-10 RX ORDER — MELATONIN 5 MG
3 TABLET ORAL AT BEDTIME
Refills: 0 | Status: COMPLETED | OUTPATIENT
Start: 2025-06-10 | End: 2025-06-10

## 2025-06-10 RX ADMIN — LEVETIRACETAM 500 MILLIGRAM(S): 10 INJECTION, SOLUTION INTRAVENOUS at 05:47

## 2025-06-10 RX ADMIN — Medication 3 MILLIGRAM(S): at 01:01

## 2025-06-10 RX ADMIN — ATORVASTATIN CALCIUM 20 MILLIGRAM(S): 80 TABLET, FILM COATED ORAL at 22:28

## 2025-06-10 RX ADMIN — Medication 650 MILLIGRAM(S): at 03:54

## 2025-06-10 RX ADMIN — Medication 1 APPLICATION(S): at 11:49

## 2025-06-10 RX ADMIN — Medication 650 MILLIGRAM(S): at 04:28

## 2025-06-10 RX ADMIN — LEVETIRACETAM 500 MILLIGRAM(S): 10 INJECTION, SOLUTION INTRAVENOUS at 17:42

## 2025-06-11 LAB
ANION GAP SERPL CALC-SCNC: 13 MMOL/L — SIGNIFICANT CHANGE UP (ref 7–14)
BASOPHILS # BLD AUTO: 0.03 K/UL — SIGNIFICANT CHANGE UP (ref 0–0.2)
BASOPHILS NFR BLD AUTO: 0.4 % — SIGNIFICANT CHANGE UP (ref 0–2)
BUN SERPL-MCNC: 24 MG/DL — HIGH (ref 7–23)
CALCIUM SERPL-MCNC: 8.6 MG/DL — SIGNIFICANT CHANGE UP (ref 8.4–10.5)
CHLORIDE SERPL-SCNC: 107 MMOL/L — SIGNIFICANT CHANGE UP (ref 98–107)
CO2 SERPL-SCNC: 19 MMOL/L — LOW (ref 22–31)
CREAT SERPL-MCNC: 0.65 MG/DL — SIGNIFICANT CHANGE UP (ref 0.5–1.3)
EGFR: 87 ML/MIN/1.73M2 — SIGNIFICANT CHANGE UP
EGFR: 87 ML/MIN/1.73M2 — SIGNIFICANT CHANGE UP
EOSINOPHIL # BLD AUTO: 0.11 K/UL — SIGNIFICANT CHANGE UP (ref 0–0.5)
EOSINOPHIL NFR BLD AUTO: 1.5 % — SIGNIFICANT CHANGE UP (ref 0–6)
GLUCOSE BLDC GLUCOMTR-MCNC: 114 MG/DL — HIGH (ref 70–99)
GLUCOSE BLDC GLUCOMTR-MCNC: 116 MG/DL — HIGH (ref 70–99)
GLUCOSE BLDC GLUCOMTR-MCNC: 138 MG/DL — HIGH (ref 70–99)
GLUCOSE BLDC GLUCOMTR-MCNC: 168 MG/DL — HIGH (ref 70–99)
GLUCOSE SERPL-MCNC: 115 MG/DL — HIGH (ref 70–99)
HCT VFR BLD CALC: 40.2 % — SIGNIFICANT CHANGE UP (ref 34.5–45)
HGB BLD-MCNC: 13.4 G/DL — SIGNIFICANT CHANGE UP (ref 11.5–15.5)
IANC: 4.65 K/UL — SIGNIFICANT CHANGE UP (ref 1.8–7.4)
IMM GRANULOCYTES NFR BLD AUTO: 0.4 % — SIGNIFICANT CHANGE UP (ref 0–0.9)
LYMPHOCYTES # BLD AUTO: 1.66 K/UL — SIGNIFICANT CHANGE UP (ref 1–3.3)
LYMPHOCYTES # BLD AUTO: 23.2 % — SIGNIFICANT CHANGE UP (ref 13–44)
MAGNESIUM SERPL-MCNC: 2.3 MG/DL — SIGNIFICANT CHANGE UP (ref 1.6–2.6)
MCHC RBC-ENTMCNC: 30.2 PG — SIGNIFICANT CHANGE UP (ref 27–34)
MCHC RBC-ENTMCNC: 33.3 G/DL — SIGNIFICANT CHANGE UP (ref 32–36)
MCV RBC AUTO: 90.5 FL — SIGNIFICANT CHANGE UP (ref 80–100)
MONOCYTES # BLD AUTO: 0.68 K/UL — SIGNIFICANT CHANGE UP (ref 0–0.9)
MONOCYTES NFR BLD AUTO: 9.5 % — SIGNIFICANT CHANGE UP (ref 2–14)
NEUTROPHILS # BLD AUTO: 4.65 K/UL — SIGNIFICANT CHANGE UP (ref 1.8–7.4)
NEUTROPHILS NFR BLD AUTO: 65 % — SIGNIFICANT CHANGE UP (ref 43–77)
NRBC # BLD AUTO: 0 K/UL — SIGNIFICANT CHANGE UP (ref 0–0)
NRBC # FLD: 0 K/UL — SIGNIFICANT CHANGE UP (ref 0–0)
NRBC BLD AUTO-RTO: 0 /100 WBCS — SIGNIFICANT CHANGE UP (ref 0–0)
PHOSPHATE SERPL-MCNC: 2.9 MG/DL — SIGNIFICANT CHANGE UP (ref 2.5–4.5)
PLATELET # BLD AUTO: 210 K/UL — SIGNIFICANT CHANGE UP (ref 150–400)
POTASSIUM SERPL-MCNC: 4.5 MMOL/L — SIGNIFICANT CHANGE UP (ref 3.5–5.3)
POTASSIUM SERPL-SCNC: 4.5 MMOL/L — SIGNIFICANT CHANGE UP (ref 3.5–5.3)
RBC # BLD: 4.44 M/UL — SIGNIFICANT CHANGE UP (ref 3.8–5.2)
RBC # FLD: 13.6 % — SIGNIFICANT CHANGE UP (ref 10.3–14.5)
SODIUM SERPL-SCNC: 139 MMOL/L — SIGNIFICANT CHANGE UP (ref 135–145)
WBC # BLD: 7.16 K/UL — SIGNIFICANT CHANGE UP (ref 3.8–10.5)
WBC # FLD AUTO: 7.16 K/UL — SIGNIFICANT CHANGE UP (ref 3.8–10.5)

## 2025-06-11 RX ADMIN — INSULIN LISPRO 1: 100 INJECTION, SOLUTION INTRAVENOUS; SUBCUTANEOUS at 13:04

## 2025-06-11 RX ADMIN — LEVETIRACETAM 500 MILLIGRAM(S): 10 INJECTION, SOLUTION INTRAVENOUS at 06:21

## 2025-06-11 RX ADMIN — ATORVASTATIN CALCIUM 20 MILLIGRAM(S): 80 TABLET, FILM COATED ORAL at 21:33

## 2025-06-11 RX ADMIN — LEVETIRACETAM 500 MILLIGRAM(S): 10 INJECTION, SOLUTION INTRAVENOUS at 18:04

## 2025-06-11 RX ADMIN — Medication 1 APPLICATION(S): at 13:03

## 2025-06-11 NOTE — PROGRESS NOTE ADULT - PROBLEM SELECTOR PLAN 4
-low dose sliding scale  -A1c 6.5

## 2025-06-11 NOTE — PROGRESS NOTE ADULT - ASSESSMENT
85 yo F with PMH DM2, HTN, and HLD who presented on 6/4/25 with AMS and was found to have multiple brain lesions concerning for malignancy. CT chest obtained for malignancy work up showing bilateral ground glass opacities (6mm) for which pulmonary is consulted.     # bilateral ggos  # brain lesion  At baseline, she is independent. No pulmonary issues, not on inhalers. Never smoker, no occupational or environmental exposures. No pets, daughter has a dog and spends time at daughter's home. Endorses recent travel to [place] ([place]) but no recent illnesses or sick contacts. No cough, sputum production, hemoptysis, fever, chills, night sweats, weight loss. No prior CT chest for comparison.   - 6 mm ggos are too small for biopsy   - low suspicion for infectious etiology without infectious symptoms,  full RVP, blood cultures,: negative  - dysphagia screen performed  - incentive spirometry, oob to chair  - will need repeat CT chest in 6-8 weeks to monitor ground glass opacities however will ultimately defer to onc and palliative based off goals of care  no further inpatient recommendations      Prior to discharge:  Please use the HOME pulm discharge token. The patient will be given a telehealth appointment in 1-2 days following discharge. Please let the patient know the appointment will be on telehealth.     [facility]  [address] [place] [id number]  [phone]
83 y/o F with PMH of DM2, HTN, HLD presents brought in by family for concern regarding ams today. Patient at baseline is aox4, lives independently, and can take care of all her adls found to have brain metastases w/ Multiple enhancing lesions in the left cerebral hemisphere in the basal ganglia, corona radiata and centrum semiovale suspicious for enhancing neoplasm dominant lesion left corona radiata measuring 2.0 cm in AP diameter by 2.4 cm transversely by 2.3 cm in craniocaudal diameter.
84F PMH DM2, HTN, HLD who presented for altered mental status (baseline is aox4, lives independently, and can take care of all her adls) found to have word finding difficulty 2/2 brain lesions c/f primary vs metastatic cancer.

## 2025-06-11 NOTE — PROGRESS NOTE ADULT - PROBLEM SELECTOR PLAN 1
-CT head w/ lesion left corona radiata measuring 2.0 cm in AP diameter by 2.4 cm transversely by 2.3 cm in craniocaudal diameter.  -obtain MRI head spine iv contrast and chest/a/p iv contrast and tumor markers for tumor staging  -appreciate neurosx input, keppra 500 mg  oral BID  -fall precautions ambulate w/ assistance  -PT eval rec [facility]
-CT head w/ lesion left corona radiata measuring 2.0 cm in AP diameter by 2.4 cm transversely by 2.3 cm in craniocaudal diameter.  - MRI head: Multifocal left hemispheric enhancing lesions.  Small right temporal meningioma.  - chest/a/p showed Innumerable sub-6 mm bilateral upper lung predominant groundglass nodules. Although indeterminate, most likely differential is inflammation or atypical infection. Dilated main pancreatic duct of the pancreatic body and tail with abrupt cut off at the proximal body. Recommend further evaluation with MR   abdomen.  - pulm recs appreciated, rvp neg, blood culture neg  - MRCP pending  -appreciate neurosx input, keppra 500 mg  oral BID  - as per NSx chart note: Patient and family requested palliative care consult and no surgery at this time. Discussed with daughter Noy over the phone on 6/9 and she is requesting transfer to [place] for second opinion prior to making treatment decisions. Started transfer process  -fall precautions ambulate w/ assistance  -PT eval rec [facility]
-CT head w/ lesion left corona radiata measuring 2.0 cm in AP diameter by 2.4 cm transversely by 2.3 cm in craniocaudal diameter.  - MRI head showed  Multifocal left hemispheric enhancing lesions.  Small right temporal meningioma.  -f/u with nsx  - chest/a/p showed Innumerable sub-6 mm bilateral upper lung predominant groundglass nodules. Although indeterminate, most likely differential is inflammation or atypical infection. Dilated main pancreatic duct of the pancreatic body and tail with abrupt cut off at the proximal body. Recommend further evaluation with MR   abdomen.  - pulm consult appreciated, rvp neg, blood culture sent- f/u results, check sputum culture  - MRCP pending  -appreciate neurosx input, keppra 500 mg  oral BID  -fall precautions ambulate w/ assistance  -PT eval rec [facility]
-CT head w/ lesion left corona radiata measuring 2.0 cm in AP diameter by 2.4 cm transversely by 2.3 cm in craniocaudal diameter.  - MRI head: Multifocal left hemispheric enhancing lesions.  Small right temporal meningioma.  - chest/a/p showed Innumerable sub-6 mm bilateral upper lung predominant groundglass nodules. Although indeterminate, most likely differential is inflammation or atypical infection. Dilated main pancreatic duct of the pancreatic body and tail with abrupt cut off at the proximal body. Recommend further evaluation with MR   abdomen.  - pulm recs appreciated, rvp neg, blood culture neg  - MRCP pending  -appreciate neurosx input, keppra 500 mg  oral BID  - as per NSx chart note: Patient and family requested palliative care consult and no surgery at this time. Discussed with daughter Noy over the phone on 6/9 and she is requesting transfer to [place] for second opinion prior to making treatment decisions. Started transfer process  -fall precautions ambulate w/ assistance  -PT eval rec [facility]
-CT head w/ lesion left corona radiata measuring 2.0 cm in AP diameter by 2.4 cm transversely by 2.3 cm in craniocaudal diameter.  -obtain MRI head spine iv contrast   - chest/a/p showed Innumerable sub-6 mm bilateral upper lung predominant groundglass nodules. Although indeterminate, most likely differential is inflammation or atypical infection. Dilated main pancreatic duct of the pancreatic body and tail with abrupt cut off at the proximal body. Recommend further evaluation with MR   abdomen.  -will get pulm consult, and MRCP  -appreciate neurosx input, keppra 500 mg  oral BID  -fall precautions ambulate w/ assistance  -PT eval rec [facility]
-CT head w/ lesion left corona radiata measuring 2.0 cm in AP diameter by 2.4 cm transversely by 2.3 cm in craniocaudal diameter.  -obtain MRI head spine iv contrast -f/u results  - chest/a/p showed Innumerable sub-6 mm bilateral upper lung predominant groundglass nodules. Although indeterminate, most likely differential is inflammation or atypical infection. Dilated main pancreatic duct of the pancreatic body and tail with abrupt cut off at the proximal body. Recommend further evaluation with MR   abdomen.  - pulm consult appreciated, rvp neg, blood culture sent- f/u results, check sputum culture  - MRCP pending  -appreciate neurosx input, keppra 500 mg  oral BID  -fall precautions ambulate w/ assistance  -PT eval rec [facility]
-CT head w/ lesion left corona radiata measuring 2.0 cm in AP diameter by 2.4 cm transversely by 2.3 cm in craniocaudal diameter.  - MRI head: Multifocal left hemispheric enhancing lesions.  Small right temporal meningioma.  - chest/a/p showed Innumerable sub-6 mm bilateral upper lung predominant groundglass nodules. Although indeterminate, most likely differential is inflammation or atypical infection. Dilated main pancreatic duct of the pancreatic body and tail with abrupt cut off at the proximal body. Recommend further evaluation with MR   abdomen.  - pulm recs appreciated, rvp neg, blood culture neg  - MRCP pending  -appreciate neurosx input, keppra 500 mg  oral BID  - as per NSx chart note: Patient and family requested palliative care consult and no surgery at this time. Discussed with daughter Noy over the phone on 6/9 and she is requesting transfer to [place] for second opinion prior to making treatment decisions. Started transfer process  -fall precautions ambulate w/ assistance  -PT eval rec [facility]
-CT head w/ lesion left corona radiata measuring 2.0 cm in AP diameter by 2.4 cm transversely by 2.3 cm in craniocaudal diameter.  - MRI head: Multifocal left hemispheric enhancing lesions.  Small right temporal meningioma.  - chest/a/p showed Innumerable sub-6 mm bilateral upper lung predominant groundglass nodules. Although indeterminate, most likely differential is inflammation or atypical infection. Dilated main pancreatic duct of the pancreatic body and tail with abrupt cut off at the proximal body. Recommend further evaluation with MR   abdomen.  - pulm recs appreciated, rvp neg, blood culture neg  - MRCP pending  -appreciate neurosx input, keppra 500 mg  oral BID  - as per NSx chart note: Patient and family requested palliative care consult and no surgery at this time. Discussed with daughter Noy over the phone on 6/9 and she is requesting transfer to [place] for second opinion prior to making treatment decisions. Started transfer process  -fall precautions ambulate w/ assistance  -PT eval rec [facility]

## 2025-06-11 NOTE — PROGRESS NOTE ADULT - REASON FOR ADMISSION
brain lesions

## 2025-06-11 NOTE — PROGRESS NOTE ADULT - TIME BILLING
Time-based billing (NON-critical care).     More than 50% of the visit was spent counseling and / or coordinating care by the attending physician.      The necessity of the time spent during the encounter on this date of service was due to: documentation in [place], reviewing chart and coordinating care with patient/ACPs and interdisciplinary staff (such as , social workers, etc) as well as reviewing vitals, laboratory data, radiology, medication list, consultants' recommendations and prior records. Interventions were performed as documented above.
Time-based billing (NON-critical care).     More than 50% of the visit was spent counseling and / or coordinating care by the attending physician.      The necessity of the time spent during the encounter on this date of service was due to: documentation in [place], reviewing chart and coordinating care with patient/ACPs and interdisciplinary staff (such as , social workers, etc) as well as reviewing vitals, laboratory data, radiology, medication list, consultants' recommendations and prior records. Interventions were performed as documented above.
Time-based billing (NON-critical care).     More than 50% of the visit was spent counseling and / or coordinating care by the attending physician.      The necessity of the time spent during the encounter on this date of service was due to: documentation in [place], reviewing chart and coordinating care with patient/ACPs and interdisciplinary staff (such as , social workers, etc) as well as reviewing vitals, laboratory data, radiology, medication list, consultants' recommendations and prior records. Interventions were performed as documented above.
Review of patient records, including history, laboratory data, imaging. Patient evaluation and assessment. Coordination of care. Time excludes teaching

## 2025-06-11 NOTE — PROGRESS NOTE ADULT - PROBLEM SELECTOR PLAN 5
F-none  E-replete prn  N-soft diet  SCD prophylaxis    Updated Noy Misael (daughter) over the phone at [phone] on 6/6 and answered all questions    Plan discussed with ACP
F-none  E-replete prn  N-soft diet  SCD prophylaxis    Updated Noy Misael (daughter) over the phone at [phone] on 6/9 and answered all questions    Plan discussed with ACP
F-none  E-replete prn  N-soft diet  SCD prophylaxis    Updated Noy Misael (daughter) over the phone at [phone] on 6/6 and answered all questions    Plan discussed with ACP
F-none  E-replete prn  N-soft diet  SCD prophylaxis    Updated Noy Misael (daughter) over the phone at [phone] on 6/9 and answered all questions    Plan discussed with ACP
F-none  E-replete prn  N-soft diet  SCD prophylaxis    Updated Noy Misael (daughter) over the phone at [phone] on 6/6 and answered all questions    Plan discussed with ACP
F-none  E-replete prn  N-soft diet  SCD prophylaxis    Updated Noy Misael (daughter) over the phone at [phone] on 6/9 and answered all questions    Plan discussed with ACP
F-none  E-replete prn  N-soft diet  SCD prophylaxis    Plan discussed with ACP
F-none  E-replete prn  N-soft diet  SCD prophylaxis    Updated Noy Misael (daughter) over the phone at [phone] on 6/9 and answered all questions    Plan discussed with ACP

## 2025-06-11 NOTE — PROGRESS NOTE ADULT - SUBJECTIVE AND OBJECTIVE BOX
Steve Suarez MD  Division of Hospitalist Medicine  Pager [phone]  Available on Teams    CC: Patient is a 84y old  Female who presents with a chief complaint of brain lesions (10 Robin 2025 16:12)        SUBJECTIVE / INTERVAL HPI: Patient seen and examined at bedside. Pt grossly denies any acute complaints.     ROS: negative unless otherwise stated above.    VITAL SIGNS:  Vital Signs Last 24 Hrs  T(C): 36.4 (11 Jun 2025 12:00), Max: 36.9 (11 Jun 2025 00:00)  T(F): 97.5 (11 Jun 2025 12:00), Max: 98.4 (11 Jun 2025 00:00)  HR: 72 (11 Jun 2025 12:00) (58 - 84)  BP: 134/60 (11 Jun 2025 12:00) (120/62 - 146/65)  BP(mean): --  RR: 18 (11 Jun 2025 12:00) (16 - 18)  SpO2: 100% (11 Jun 2025 12:00) (97% - 100%)    Parameters below as of 11 Jun 2025 12:00  Patient On (Oxygen Delivery Method): room air          06-10-25 @ 07:01  -  06-11-25 @ 07:00  --------------------------------------------------------  IN: 0 mL / OUT: 700 mL / NET: -700 mL        PHYSICAL EXAM:    General: NAD  HEENT: MMM  Neck: supple  Cardiovascular: +S1/S2; RRR  Respiratory: CTA B/L; no W/R/R  Gastrointestinal: soft, NT/ND  Extremities: WWP; no edema, clubbing or cyanosis  Neurological: awake and alert; word finding difficulty but otherwise communicating and able to follow commands     MEDICATIONS:  MEDICATIONS  (STANDING):  atorvastatin 20 milliGRAM(s) Oral at bedtime  chlorhexidine 2% Cloths 1 Application(s) Topical daily  dextrose 5%. 1000 milliLiter(s) (100 mL/Hr) IV Continuous <Continuous>  dextrose 5%. 1000 milliLiter(s) (50 mL/Hr) IV Continuous <Continuous>  dextrose 50% Injectable 25 Gram(s) IV Push once  dextrose 50% Injectable 12.5 Gram(s) IV Push once  dextrose 50% Injectable 25 Gram(s) IV Push once  glucagon  Injectable 1 milliGRAM(s) IntraMuscular once  insulin lispro (ADMELOG) corrective regimen sliding scale   SubCutaneous three times a day before meals  insulin lispro (ADMELOG) corrective regimen sliding scale   SubCutaneous at bedtime  levETIRAcetam   Injectable 500 milliGRAM(s) IV Push every 12 hours    MEDICATIONS  (PRN):  acetaminophen     Tablet .. 650 milliGRAM(s) Oral every 6 hours PRN Temp greater or equal to 38C (100.4F), Mild Pain (1 - 3)  dextrose Oral Gel 15 Gram(s) Oral once PRN Blood Glucose LESS THAN 70 milliGRAM(s)/deciliter      ALLERGIES:  Allergies    No Known Allergies    Intolerances        LABS:                        13.4   7.16  )-----------( 210      ( 11 Jun 2025 05:15 )             40.2     06-11    139  |  107  |  24[H]  ----------------------------<  115[H]  4.5   |  19[L]  |  0.65    Ca    8.6      11 Jun 2025 05:15  Phos  2.9     06-11  Mg     2.30     06-11        Urinalysis Basic - ( 11 Jun 2025 05:15 )    Color: x / Appearance: x / SG: x / pH: x  Gluc: 115 mg/dL / Ketone: x  / Bili: x / Urobili: x   Blood: x / Protein: x / Nitrite: x   Leuk Esterase: x / RBC: x / WBC x   Sq Epi: x / Non Sq Epi: x / Bacteria: x      CAPILLARY BLOOD GLUCOSE      POCT Blood Glucose.: 168 mg/dL (11 Jun 2025 12:55)      RADIOLOGY & ADDITIONAL TESTS: Reviewed.

## 2025-06-11 NOTE — PROGRESS NOTE ADULT - PROBLEM SELECTOR PROBLEM 1
Brain lesion

## 2025-06-11 NOTE — PROGRESS NOTE ADULT - PROBLEM SELECTOR PLAN 2
Has sinus bradycardia at night time  - tsh wnl  - asymptomatic  - cont to monitor
Has sinus bradycardia at night time  - tsh wnl  - asymptomatic  - cont to monitor
Has sinus bradycardia at night time, now improving, tsh wnl  ekg with sinus arrythmia  cont to monitor
Overnight noted danna to 43, tsh wnl  check ekg
Has sinus bradycardia at night time, now improving, tsh wnl  ekg with sinus arrythmia  cont to monitor
Has sinus bradycardia at night time, now improving, again reported danna, check rpt ekg,  tsh wnl  cont to monitor
Has sinus bradycardia at night time  - tsh wnl  - asymptomatic  - cont to monitor
Has sinus bradycardia at night time  - tsh wnl  - asymptomatic  - cont to monitor

## 2025-06-11 NOTE — PROGRESS NOTE ADULT - PROBLEM SELECTOR PLAN 3
-lipitor 20 mg daily

## 2025-06-12 ENCOUNTER — TRANSCRIPTION ENCOUNTER (OUTPATIENT)
Age: 85
End: 2025-06-12

## 2025-06-12 VITALS
SYSTOLIC BLOOD PRESSURE: 139 MMHG | DIASTOLIC BLOOD PRESSURE: 62 MMHG | HEART RATE: 61 BPM | TEMPERATURE: 97 F | RESPIRATION RATE: 19 BRPM | OXYGEN SATURATION: 100 %

## 2025-06-12 PROBLEM — E11.9 TYPE 2 DIABETES MELLITUS WITHOUT COMPLICATIONS: Chronic | Status: ACTIVE | Noted: 2025-03-12

## 2025-06-12 LAB
ANION GAP SERPL CALC-SCNC: 12 MMOL/L — SIGNIFICANT CHANGE UP (ref 7–14)
BASOPHILS # BLD AUTO: 0.06 K/UL — SIGNIFICANT CHANGE UP (ref 0–0.2)
BASOPHILS NFR BLD AUTO: 0.7 % — SIGNIFICANT CHANGE UP (ref 0–2)
BUN SERPL-MCNC: 27 MG/DL — HIGH (ref 7–23)
CALCIUM SERPL-MCNC: 9.2 MG/DL — SIGNIFICANT CHANGE UP (ref 8.4–10.5)
CHLORIDE SERPL-SCNC: 106 MMOL/L — SIGNIFICANT CHANGE UP (ref 98–107)
CO2 SERPL-SCNC: 21 MMOL/L — LOW (ref 22–31)
CREAT SERPL-MCNC: 0.63 MG/DL — SIGNIFICANT CHANGE UP (ref 0.5–1.3)
CULTURE RESULTS: SIGNIFICANT CHANGE UP
CULTURE RESULTS: SIGNIFICANT CHANGE UP
EGFR: 87 ML/MIN/1.73M2 — SIGNIFICANT CHANGE UP
EGFR: 87 ML/MIN/1.73M2 — SIGNIFICANT CHANGE UP
EOSINOPHIL # BLD AUTO: 0.11 K/UL — SIGNIFICANT CHANGE UP (ref 0–0.5)
EOSINOPHIL NFR BLD AUTO: 1.3 % — SIGNIFICANT CHANGE UP (ref 0–6)
GLUCOSE BLDC GLUCOMTR-MCNC: 108 MG/DL — HIGH (ref 70–99)
GLUCOSE BLDC GLUCOMTR-MCNC: 140 MG/DL — HIGH (ref 70–99)
GLUCOSE BLDC GLUCOMTR-MCNC: 154 MG/DL — HIGH (ref 70–99)
GLUCOSE SERPL-MCNC: 107 MG/DL — HIGH (ref 70–99)
HCT VFR BLD CALC: 41 % — SIGNIFICANT CHANGE UP (ref 34.5–45)
HGB BLD-MCNC: 13.8 G/DL — SIGNIFICANT CHANGE UP (ref 11.5–15.5)
IANC: 5.43 K/UL — SIGNIFICANT CHANGE UP (ref 1.8–7.4)
IMM GRANULOCYTES NFR BLD AUTO: 0.7 % — SIGNIFICANT CHANGE UP (ref 0–0.9)
LYMPHOCYTES # BLD AUTO: 2.07 K/UL — SIGNIFICANT CHANGE UP (ref 1–3.3)
LYMPHOCYTES # BLD AUTO: 24.3 % — SIGNIFICANT CHANGE UP (ref 13–44)
MAGNESIUM SERPL-MCNC: 2.3 MG/DL — SIGNIFICANT CHANGE UP (ref 1.6–2.6)
MCHC RBC-ENTMCNC: 30.2 PG — SIGNIFICANT CHANGE UP (ref 27–34)
MCHC RBC-ENTMCNC: 33.7 G/DL — SIGNIFICANT CHANGE UP (ref 32–36)
MCV RBC AUTO: 89.7 FL — SIGNIFICANT CHANGE UP (ref 80–100)
MONOCYTES # BLD AUTO: 0.78 K/UL — SIGNIFICANT CHANGE UP (ref 0–0.9)
MONOCYTES NFR BLD AUTO: 9.2 % — SIGNIFICANT CHANGE UP (ref 2–14)
NEUTROPHILS # BLD AUTO: 5.43 K/UL — SIGNIFICANT CHANGE UP (ref 1.8–7.4)
NEUTROPHILS NFR BLD AUTO: 63.8 % — SIGNIFICANT CHANGE UP (ref 43–77)
NRBC # BLD AUTO: 0 K/UL — SIGNIFICANT CHANGE UP (ref 0–0)
NRBC # FLD: 0 K/UL — SIGNIFICANT CHANGE UP (ref 0–0)
NRBC BLD AUTO-RTO: 0 /100 WBCS — SIGNIFICANT CHANGE UP (ref 0–0)
PHOSPHATE SERPL-MCNC: 3.5 MG/DL — SIGNIFICANT CHANGE UP (ref 2.5–4.5)
PLATELET # BLD AUTO: 207 K/UL — SIGNIFICANT CHANGE UP (ref 150–400)
POTASSIUM SERPL-MCNC: 5.2 MMOL/L — SIGNIFICANT CHANGE UP (ref 3.5–5.3)
POTASSIUM SERPL-SCNC: 5.2 MMOL/L — SIGNIFICANT CHANGE UP (ref 3.5–5.3)
RBC # BLD: 4.57 M/UL — SIGNIFICANT CHANGE UP (ref 3.8–5.2)
RBC # FLD: 13.4 % — SIGNIFICANT CHANGE UP (ref 10.3–14.5)
SODIUM SERPL-SCNC: 139 MMOL/L — SIGNIFICANT CHANGE UP (ref 135–145)
SPECIMEN SOURCE: SIGNIFICANT CHANGE UP
SPECIMEN SOURCE: SIGNIFICANT CHANGE UP
WBC # BLD: 8.51 K/UL — SIGNIFICANT CHANGE UP (ref 3.8–10.5)
WBC # FLD AUTO: 8.51 K/UL — SIGNIFICANT CHANGE UP (ref 3.8–10.5)

## 2025-06-12 PROCEDURE — 99239 HOSP IP/OBS DSCHRG MGMT >30: CPT

## 2025-06-12 PROCEDURE — 74183 MRI ABD W/O CNTR FLWD CNTR: CPT | Mod: 26

## 2025-06-12 RX ORDER — METFORMIN HYDROCHLORIDE 850 MG/1
0 TABLET ORAL
Refills: 0 | DISCHARGE

## 2025-06-12 RX ORDER — ATORVASTATIN CALCIUM 80 MG/1
1 TABLET, FILM COATED ORAL
Refills: 0 | DISCHARGE

## 2025-06-12 RX ORDER — ATORVASTATIN CALCIUM 80 MG/1
0 TABLET, FILM COATED ORAL
Refills: 0 | DISCHARGE

## 2025-06-12 RX ORDER — METFORMIN HYDROCHLORIDE 850 MG/1
1 TABLET ORAL
Qty: 0 | Refills: 0 | DISCHARGE

## 2025-06-12 RX ADMIN — LEVETIRACETAM 500 MILLIGRAM(S): 10 INJECTION, SOLUTION INTRAVENOUS at 17:57

## 2025-06-12 RX ADMIN — INSULIN LISPRO 1: 100 INJECTION, SOLUTION INTRAVENOUS; SUBCUTANEOUS at 12:08

## 2025-06-12 RX ADMIN — Medication 1 APPLICATION(S): at 17:38

## 2025-06-12 RX ADMIN — LEVETIRACETAM 500 MILLIGRAM(S): 10 INJECTION, SOLUTION INTRAVENOUS at 06:00

## 2025-06-12 NOTE — DISCHARGE NOTE NURSING/CASE MANAGEMENT/SOCIAL WORK - FINANCIAL ASSISTANCE
[facility] provides services at a reduced cost to those who are determined to be eligible through [facility]’s financial assistance program. Information regarding [facility]’s financial assistance program can be found by going to [email or web address] or by calling [phone].

## 2025-06-12 NOTE — DISCHARGE NOTE NURSING/CASE MANAGEMENT/SOCIAL WORK - PATIENT PORTAL LINK FT
You can access the FollowMyHealth Patient Portal offered by [facility] by registering at the following website: [email or web address]. By joining [email or web address]’s FollowMyHealth portal, you will also be able to view your health information using other applications (apps) compatible with our system.